# Patient Record
Sex: FEMALE | Race: WHITE
[De-identification: names, ages, dates, MRNs, and addresses within clinical notes are randomized per-mention and may not be internally consistent; named-entity substitution may affect disease eponyms.]

---

## 2018-05-18 ENCOUNTER — HOSPITAL ENCOUNTER (INPATIENT)
Dept: HOSPITAL 54 - ER | Age: 53
LOS: 5 days | Discharge: HOME | DRG: 242 | End: 2018-05-23
Attending: NURSE PRACTITIONER | Admitting: NURSE PRACTITIONER
Payer: MEDICAID

## 2018-05-18 VITALS — SYSTOLIC BLOOD PRESSURE: 160 MMHG | DIASTOLIC BLOOD PRESSURE: 95 MMHG

## 2018-05-18 VITALS — SYSTOLIC BLOOD PRESSURE: 142 MMHG | DIASTOLIC BLOOD PRESSURE: 80 MMHG

## 2018-05-18 VITALS — BODY MASS INDEX: 22.34 KG/M2 | WEIGHT: 144.03 LBS | HEIGHT: 67.5 IN

## 2018-05-18 VITALS — DIASTOLIC BLOOD PRESSURE: 92 MMHG | SYSTOLIC BLOOD PRESSURE: 158 MMHG

## 2018-05-18 VITALS — SYSTOLIC BLOOD PRESSURE: 158 MMHG | DIASTOLIC BLOOD PRESSURE: 92 MMHG

## 2018-05-18 VITALS — SYSTOLIC BLOOD PRESSURE: 165 MMHG | DIASTOLIC BLOOD PRESSURE: 92 MMHG

## 2018-05-18 DIAGNOSIS — K85.90: ICD-10-CM

## 2018-05-18 DIAGNOSIS — D63.1: ICD-10-CM

## 2018-05-18 DIAGNOSIS — D69.6: ICD-10-CM

## 2018-05-18 DIAGNOSIS — I12.0: ICD-10-CM

## 2018-05-18 DIAGNOSIS — E87.2: ICD-10-CM

## 2018-05-18 DIAGNOSIS — N18.6: ICD-10-CM

## 2018-05-18 DIAGNOSIS — N39.0: ICD-10-CM

## 2018-05-18 DIAGNOSIS — E44.1: ICD-10-CM

## 2018-05-18 DIAGNOSIS — D62: ICD-10-CM

## 2018-05-18 DIAGNOSIS — D69.1: ICD-10-CM

## 2018-05-18 DIAGNOSIS — K22.6: Primary | ICD-10-CM

## 2018-05-18 DIAGNOSIS — E83.42: ICD-10-CM

## 2018-05-18 DIAGNOSIS — Q61.3: ICD-10-CM

## 2018-05-18 DIAGNOSIS — Z86.73: ICD-10-CM

## 2018-05-18 DIAGNOSIS — G44.209: ICD-10-CM

## 2018-05-18 DIAGNOSIS — K29.70: ICD-10-CM

## 2018-05-18 LAB
ALBUMIN SERPL BCP-MCNC: 3.1 G/DL (ref 3.4–5)
ALP SERPL-CCNC: 20 U/L (ref 46–116)
ALT SERPL W P-5'-P-CCNC: 12 U/L (ref 12–78)
APTT PPP: 23 SEC (ref 23–34)
AST SERPL W P-5'-P-CCNC: 8 U/L (ref 15–37)
BASE EXCESS BLDA CALC-SCNC: -17.5 MMOL/L
BASOPHILS # BLD AUTO: 0 /CMM (ref 0–0.2)
BASOPHILS NFR BLD AUTO: 0 % (ref 0–2)
BASOPHILS NFR BLD AUTO: 0.2 % (ref 0–2)
BASOPHILS NFR BLD AUTO: 0.3 % (ref 0–2)
BILIRUB DIRECT SERPL-MCNC: 0.1 MG/DL (ref 0–0.2)
BILIRUB SERPL-MCNC: 0.4 MG/DL (ref 0.2–1)
BUN SERPL-MCNC: 238 MG/DL (ref 7–18)
CALCIUM SERPL-MCNC: 7.7 MG/DL (ref 8.5–10.1)
CHLORIDE SERPL-SCNC: 100 MMOL/L (ref 98–107)
CO2 SERPL-SCNC: 13 MMOL/L (ref 21–32)
CREAT SERPL-MCNC: 21.1 MG/DL (ref 0.6–1.3)
EOSINOPHIL NFR BLD AUTO: 0 % (ref 0–6)
EOSINOPHIL NFR BLD AUTO: 0.1 % (ref 0–6)
EOSINOPHIL NFR BLD AUTO: 0.9 % (ref 0–6)
FERRITIN SERPL-MCNC: 26 NG/ML (ref 8–388)
GLUCOSE SERPL-MCNC: 149 MG/DL (ref 74–106)
GLUCOSE UR STRIP-MCNC: (no result) MG/DL
HCT VFR BLD AUTO: 21 % (ref 33–45)
HCT VFR BLD AUTO: 23 % (ref 33–45)
HCT VFR BLD AUTO: 25 % (ref 33–45)
HGB BLD-MCNC: 7 G/DL (ref 11.5–14.8)
HGB BLD-MCNC: 7.8 G/DL (ref 11.5–14.8)
HGB BLD-MCNC: 8.1 G/DL (ref 11.5–14.8)
INHALED O2 CONCENTRATION: 21 %
INR PPP: 0.9 (ref 0.85–1.15)
IRON SERPL-MCNC: 68 UG/DL (ref 50–175)
LIPASE SERPL-CCNC: 622 U/L (ref 73–393)
LYMPHOCYTES NFR BLD AUTO: 0.2 /CMM (ref 0.8–4.8)
LYMPHOCYTES NFR BLD AUTO: 0.4 /CMM (ref 0.8–4.8)
LYMPHOCYTES NFR BLD AUTO: 0.4 /CMM (ref 0.8–4.8)
LYMPHOCYTES NFR BLD AUTO: 2.7 % (ref 20–44)
LYMPHOCYTES NFR BLD AUTO: 3.7 % (ref 20–44)
LYMPHOCYTES NFR BLD AUTO: 4.7 % (ref 20–44)
MCHC RBC AUTO-ENTMCNC: 33 G/DL (ref 31–36)
MCHC RBC AUTO-ENTMCNC: 33 G/DL (ref 31–36)
MCHC RBC AUTO-ENTMCNC: 34 G/DL (ref 31–36)
MCV RBC AUTO: 90 FL (ref 82–100)
MONOCYTES NFR BLD AUTO: 0.1 /CMM (ref 0.1–1.3)
MONOCYTES NFR BLD AUTO: 0.3 /CMM (ref 0.1–1.3)
MONOCYTES NFR BLD AUTO: 0.6 /CMM (ref 0.1–1.3)
MONOCYTES NFR BLD AUTO: 1.7 % (ref 2–12)
MONOCYTES NFR BLD AUTO: 3.2 % (ref 2–12)
MONOCYTES NFR BLD AUTO: 6.4 % (ref 2–12)
NEUTROPHILS # BLD AUTO: 7.5 /CMM (ref 1.8–8.9)
NEUTROPHILS # BLD AUTO: 7.6 /CMM (ref 1.8–8.9)
NEUTROPHILS # BLD AUTO: 8.6 /CMM (ref 1.8–8.9)
NEUTROPHILS NFR BLD AUTO: 89.9 % (ref 43–81)
NEUTROPHILS NFR BLD AUTO: 91.7 % (ref 43–81)
NEUTROPHILS NFR BLD AUTO: 94.5 % (ref 43–81)
PCO2 TEMP ADJ BLDA: 26.5 MMHG (ref 35–45)
PH TEMP ADJ BLDA: 7.17 [PH] (ref 7.35–7.45)
PH UR STRIP: 6 [PH] (ref 5–8)
PLATELET # BLD AUTO: 149 /CMM (ref 150–450)
PLATELET # BLD AUTO: 161 /CMM (ref 150–450)
PLATELET # BLD AUTO: 207 /CMM (ref 150–450)
PO2 TEMP ADJ BLDA: 43 MMHG (ref 75–100)
POTASSIUM SERPL-SCNC: 4.8 MMOL/L (ref 3.5–5.1)
PROT SERPL-MCNC: 6.9 G/DL (ref 6.4–8.2)
PROT UR QL STRIP: >=300 MG/DL
RBC # BLD AUTO: 2.35 MIL/UL (ref 4–5.2)
RBC # BLD AUTO: 2.56 MIL/UL (ref 4–5.2)
RBC # BLD AUTO: 2.78 MIL/UL (ref 4–5.2)
RBC #/AREA URNS HPF: (no result) /HPF (ref 0–2)
RDW COEFFICIENT OF VARIATION: 15.1 (ref 11.5–15)
RDW COEFFICIENT OF VARIATION: 15.4 (ref 11.5–15)
RDW COEFFICIENT OF VARIATION: 15.4 (ref 11.5–15)
SAO2 % BLDA: 68.9 % (ref 92–98.5)
SODIUM SERPL-SCNC: 137 MMOL/L (ref 136–145)
TIBC SERPL-MCNC: 344 UG/DL (ref 250–450)
UROBILINOGEN UR STRIP-MCNC: 0.2 EU/DL
VENTILATION MODE VENT: (no result)
WBC #/AREA URNS HPF: (no result) /HPF (ref 0–3)
WBC NRBC COR # BLD AUTO: 7.9 K/UL (ref 4.3–11)
WBC NRBC COR # BLD AUTO: 8.3 K/UL (ref 4.3–11)
WBC NRBC COR # BLD AUTO: 9.6 K/UL (ref 4.3–11)

## 2018-05-18 PROCEDURE — Z7610: HCPCS

## 2018-05-18 PROCEDURE — 06HY33Z INSERTION OF INFUSION DEVICE INTO LOWER VEIN, PERCUTANEOUS APPROACH: ICD-10-PCS | Performed by: NURSE PRACTITIONER

## 2018-05-18 PROCEDURE — A4216 STERILE WATER/SALINE, 10 ML: HCPCS

## 2018-05-18 PROCEDURE — 05HY33Z INSERTION OF INFUSION DEVICE INTO UPPER VEIN, PERCUTANEOUS APPROACH: ICD-10-PCS

## 2018-05-18 PROCEDURE — A4606 OXYGEN PROBE USED W OXIMETER: HCPCS

## 2018-05-18 PROCEDURE — C9113 INJ PANTOPRAZOLE SODIUM, VIA: HCPCS

## 2018-05-18 PROCEDURE — 5A1D70Z PERFORMANCE OF URINARY FILTRATION, INTERMITTENT, LESS THAN 6 HOURS PER DAY: ICD-10-PCS | Performed by: INTERNAL MEDICINE

## 2018-05-18 PROCEDURE — A6402 STERILE GAUZE <= 16 SQ IN: HCPCS

## 2018-05-18 PROCEDURE — A6403 STERILE GAUZE>16 <= 48 SQ IN: HCPCS

## 2018-05-18 PROCEDURE — C1750 CATH, HEMODIALYSIS,LONG-TERM: HCPCS

## 2018-05-18 RX ADMIN — DEXTROSE MONOHYDRATE PRN MG: 50 INJECTION, SOLUTION INTRAVENOUS at 20:19

## 2018-05-18 RX ADMIN — Medication SCH MG: at 17:08

## 2018-05-18 RX ADMIN — SODIUM CHLORIDE SCH MG: 9 INJECTION, SOLUTION INTRAVENOUS at 17:08

## 2018-05-18 NOTE — NUR
TD/RN     DIALYSIS CATHETER & MIDLINE PLACEMENT



DR. YANES AT BEDSIDE EXPLAINING THE PROCEDURE OF DIALYSIS CATHETER & MIDLINE PLACEMENT TO 
PATIENT, VERBALIZED UNDERSTANDING.  



CONSENTS FOR DIALYSIS CATHETER & MIDLINE PLACEMENT, ANESTHESIA FOR THE SAID PROCEDURES, EGD 
AND BLOOD TRANSFUSION OBTAINED FROM PATIENT AND FILED INTO PT'S CHART.  



DIALYSIS CATHETER & MIDLINE PLACEMENT BEING PERFORMED BY BEDSIDE BY DR. YANES.  MONITORING.

## 2018-05-18 NOTE — NUR
TD/RN     DIALYSIS TX



DIALYSIS TX BEGINS, PT WILL RECEIVED 1 UNIT OF PRBC WITH THE TX.  MONITORING.

## 2018-05-18 NOTE — NUR
TD/RN     REPORT FROM ER



REPORT RECEIVED FROM ER NURSE LILIAN FOR PT TO BE ADMITTED FOR GI BLEED UNDER THE CARE OF 
SEAN TERAN NP.  AWAITING FOR PT'S ARRIVAL.

## 2018-05-18 NOTE — NUR
TD/RN     EPOGEN - REFUSED



PT REFUSED EPOGEN SHOT, PER PT SHE ALREADY RECEIVED EPOGEN LAST THURSDAY, NP NOTIFIED.

## 2018-05-18 NOTE — NUR
TD/RN     AM SHIFT END NOTES



DIALYSIS ON GOING, NO ACUTE BLEEDING NOTED SINCE PT WAS ADMITTED TO THE UNIT.  PT ENDORSED 
TO PM NURSE TO CONTINUE CARE. CL WITHIN REACHED AND SAFETY MAINTAINED.

## 2018-05-18 NOTE — NUR
TD/RN     1 PRBC WITH DIALYSIS



NOTIFIED JAMMIE TERAN. OF CRITICAL LAB VALUE HGB 7.0 & HCT 21, WITH ORDER RECEIVED VERBAL 
ORDER TO TRANSFUSE 1 UNIT OF PRBC WITH DIALYSIS.  ORDER NOTED AND TO BE CARRIED.

## 2018-05-18 NOTE — NUR
TD/RN     CTA OF BRAIN



CTA OF BRAIN TO R/O ANEURYSM PERFORMED, PT TOLERATED PROCEDURE, RETURNED TO ROOM.  
MONITORING CONTINUED.

## 2018-05-18 NOTE — NUR
RN JOSUÉ NOTE 



RECEIVED PATIENT RESTING COMFORTABLY IN BED, SPEECH CLEAR, AOX3, HD IN PROGRESS AND 1 UNIT 
PRBC RUNNING IN RA MIDLINE, DENIES PAIN, HOB ELEVATED, DENIES ANY S/SX OF RESPIRATORY OR 
CARDIAC DISTRESS, SKIN KEPT CLEAN AND DRY, AMBULATORY, DENIES PAIN, SAFETY MAINTAINED AT ALL 
TIMES, CALL LIGHT WITHIN REACH, WILL CONTINUE TO MONITOR FOR ANY CHANGES IN CONDITION.

## 2018-05-18 NOTE — NUR
AAOX3, C/O vomiting blood clots x 24 hrs. headache x 10 days odor during 
urination x 4 days. denies hematuria, or discharge. RR is even and unlabored 
with nad noted. skin is warm and dry. Awaiting md for bret.

## 2018-05-18 NOTE — NUR
TD/RN     ADMIT TO JOSUÉ - ROOM 117#1



PT ARRIVED VIA GURNEY FROM ER ACCOMPANIED BY ER NURSE LILIAN.  PT A/O X 4, DENIES ANY 
SYMPTOMS, NO ACUTE BLEEDING NOTED.  ON ROOM AIR SATURATING @ 100%, LUNG SOUNDS CLEAR.  TELE 
MONITORING PLACED, SINUS RHYTHM, HR 96.  IV SITE FLUSHED, OCCLUDED, WILL PLACED ANOTHER IV 
ACCESS.  ON GOING ADMITTING PROTOCOLS BEING PROCESSED.  CL WITHIN REACHED AND SAFETY 
MAINTAINED.  ON GOING MONITORING.

## 2018-05-19 VITALS — DIASTOLIC BLOOD PRESSURE: 87 MMHG | SYSTOLIC BLOOD PRESSURE: 153 MMHG

## 2018-05-19 VITALS — DIASTOLIC BLOOD PRESSURE: 88 MMHG | SYSTOLIC BLOOD PRESSURE: 133 MMHG

## 2018-05-19 VITALS — DIASTOLIC BLOOD PRESSURE: 90 MMHG | SYSTOLIC BLOOD PRESSURE: 146 MMHG

## 2018-05-19 VITALS — SYSTOLIC BLOOD PRESSURE: 133 MMHG | DIASTOLIC BLOOD PRESSURE: 88 MMHG

## 2018-05-19 VITALS — SYSTOLIC BLOOD PRESSURE: 122 MMHG | DIASTOLIC BLOOD PRESSURE: 87 MMHG

## 2018-05-19 VITALS — DIASTOLIC BLOOD PRESSURE: 97 MMHG | SYSTOLIC BLOOD PRESSURE: 167 MMHG

## 2018-05-19 VITALS — DIASTOLIC BLOOD PRESSURE: 91 MMHG | SYSTOLIC BLOOD PRESSURE: 148 MMHG

## 2018-05-19 LAB
BASOPHILS # BLD AUTO: 0 /CMM (ref 0–0.2)
BASOPHILS NFR BLD AUTO: 0.1 % (ref 0–2)
BASOPHILS NFR BLD AUTO: 0.1 % (ref 0–2)
BASOPHILS NFR BLD AUTO: 0.3 % (ref 0–2)
BUN SERPL-MCNC: 150 MG/DL (ref 7–18)
CALCIUM SERPL-MCNC: 7.7 MG/DL (ref 8.5–10.1)
CHLORIDE SERPL-SCNC: 103 MMOL/L (ref 98–107)
CHOLEST SERPL-MCNC: 139 MG/DL (ref ?–200)
CO2 SERPL-SCNC: 16 MMOL/L (ref 21–32)
CREAT SERPL-MCNC: 15.2 MG/DL (ref 0.6–1.3)
EOSINOPHIL NFR BLD AUTO: 0.1 % (ref 0–6)
EOSINOPHIL NFR BLD AUTO: 0.1 % (ref 0–6)
EOSINOPHIL NFR BLD AUTO: 0.2 % (ref 0–6)
GLUCOSE SERPL-MCNC: 92 MG/DL (ref 74–106)
HCT VFR BLD AUTO: 21 % (ref 33–45)
HCT VFR BLD AUTO: 21 % (ref 33–45)
HCT VFR BLD AUTO: 23 % (ref 33–45)
HDLC SERPL-MCNC: 43 MG/DL (ref 40–60)
HGB BLD-MCNC: 7.1 G/DL (ref 11.5–14.8)
HGB BLD-MCNC: 7.2 G/DL (ref 11.5–14.8)
HGB BLD-MCNC: 7.5 G/DL (ref 11.5–14.8)
LDLC SERPL DIRECT ASSAY-MCNC: 88 MG/DL (ref 0–99)
LYMPHOCYTES NFR BLD AUTO: 0.6 /CMM (ref 0.8–4.8)
LYMPHOCYTES NFR BLD AUTO: 0.6 /CMM (ref 0.8–4.8)
LYMPHOCYTES NFR BLD AUTO: 0.7 /CMM (ref 0.8–4.8)
LYMPHOCYTES NFR BLD AUTO: 8.8 % (ref 20–44)
LYMPHOCYTES NFR BLD AUTO: 9.2 % (ref 20–44)
LYMPHOCYTES NFR BLD AUTO: 9.4 % (ref 20–44)
MAGNESIUM SERPL-MCNC: 1.5 MG/DL (ref 1.8–2.4)
MCHC RBC AUTO-ENTMCNC: 33 G/DL (ref 31–36)
MCHC RBC AUTO-ENTMCNC: 33 G/DL (ref 31–36)
MCHC RBC AUTO-ENTMCNC: 34 G/DL (ref 31–36)
MCV RBC AUTO: 90 FL (ref 82–100)
MONOCYTES NFR BLD AUTO: 0.5 /CMM (ref 0.1–1.3)
MONOCYTES NFR BLD AUTO: 0.5 /CMM (ref 0.1–1.3)
MONOCYTES NFR BLD AUTO: 0.6 /CMM (ref 0.1–1.3)
MONOCYTES NFR BLD AUTO: 7.1 % (ref 2–12)
MONOCYTES NFR BLD AUTO: 7.3 % (ref 2–12)
MONOCYTES NFR BLD AUTO: 7.7 % (ref 2–12)
NEUTROPHILS # BLD AUTO: 4.9 /CMM (ref 1.8–8.9)
NEUTROPHILS # BLD AUTO: 5.6 /CMM (ref 1.8–8.9)
NEUTROPHILS # BLD AUTO: 6.5 /CMM (ref 1.8–8.9)
NEUTROPHILS NFR BLD AUTO: 82.7 % (ref 43–81)
NEUTROPHILS NFR BLD AUTO: 83.2 % (ref 43–81)
NEUTROPHILS NFR BLD AUTO: 83.7 % (ref 43–81)
PHOSPHATE SERPL-MCNC: 7.2 MG/DL (ref 2.5–4.9)
PLATELET # BLD AUTO: 127 /CMM (ref 150–450)
PLATELET # BLD AUTO: 138 /CMM (ref 150–450)
PLATELET # BLD AUTO: 142 /CMM (ref 150–450)
POTASSIUM SERPL-SCNC: 4.4 MMOL/L (ref 3.5–5.1)
RBC # BLD AUTO: 2.34 MIL/UL (ref 4–5.2)
RBC # BLD AUTO: 2.36 MIL/UL (ref 4–5.2)
RBC # BLD AUTO: 2.49 MIL/UL (ref 4–5.2)
RDW COEFFICIENT OF VARIATION: 15.2 (ref 11.5–15)
RDW COEFFICIENT OF VARIATION: 15.5 (ref 11.5–15)
RDW COEFFICIENT OF VARIATION: 15.7 (ref 11.5–15)
SODIUM SERPL-SCNC: 140 MMOL/L (ref 136–145)
TRIGL SERPL-MCNC: 92 MG/DL (ref 30–150)
TSH SERPL DL<=0.005 MIU/L-ACNC: 3.67 UIU/ML (ref 0.36–3.74)
WBC NRBC COR # BLD AUTO: 5.9 K/UL (ref 4.3–11)
WBC NRBC COR # BLD AUTO: 6.7 K/UL (ref 4.3–11)
WBC NRBC COR # BLD AUTO: 7.8 K/UL (ref 4.3–11)

## 2018-05-19 PROCEDURE — 0W3P8ZZ CONTROL BLEEDING IN GASTROINTESTINAL TRACT, VIA NATURAL OR ARTIFICIAL OPENING ENDOSCOPIC: ICD-10-PCS

## 2018-05-19 PROCEDURE — 5A1D70Z PERFORMANCE OF URINARY FILTRATION, INTERMITTENT, LESS THAN 6 HOURS PER DAY: ICD-10-PCS

## 2018-05-19 PROCEDURE — 0DB68ZX EXCISION OF STOMACH, VIA NATURAL OR ARTIFICIAL OPENING ENDOSCOPIC, DIAGNOSTIC: ICD-10-PCS

## 2018-05-19 RX ADMIN — SODIUM CHLORIDE SCH MG: 9 INJECTION, SOLUTION INTRAVENOUS at 08:00

## 2018-05-19 RX ADMIN — DEXTROSE MONOHYDRATE PRN MG: 50 INJECTION, SOLUTION INTRAVENOUS at 05:06

## 2018-05-19 RX ADMIN — DEXTROSE MONOHYDRATE PRN MG: 50 INJECTION, SOLUTION INTRAVENOUS at 16:25

## 2018-05-19 RX ADMIN — MAGNESIUM SULFATE IN DEXTROSE SCH MLS/HR: 10 INJECTION, SOLUTION INTRAVENOUS at 13:16

## 2018-05-19 RX ADMIN — DEXTROSE MONOHYDRATE PRN MG: 50 INJECTION, SOLUTION INTRAVENOUS at 21:46

## 2018-05-19 RX ADMIN — AMLODIPINE BESYLATE SCH MG: 5 TABLET ORAL at 08:38

## 2018-05-19 RX ADMIN — FERROUS SULFATE TAB 325 MG (65 MG ELEMENTAL FE) SCH MG: 325 (65 FE) TAB at 13:16

## 2018-05-19 RX ADMIN — Medication SCH G: at 17:42

## 2018-05-19 RX ADMIN — PANTOPRAZOLE SODIUM SCH MG: 40 TABLET, DELAYED RELEASE ORAL at 17:42

## 2018-05-19 RX ADMIN — Medication PRN EACH: at 21:45

## 2018-05-19 RX ADMIN — Medication SCH MG: at 17:42

## 2018-05-19 RX ADMIN — Medication SCH MG: at 09:00

## 2018-05-19 RX ADMIN — DEXTROSE MONOHYDRATE SCH MLS/HR: 50 INJECTION, SOLUTION INTRAVENOUS at 09:05

## 2018-05-19 RX ADMIN — ACETAMINOPHEN PRN MG: 325 TABLET ORAL at 16:48

## 2018-05-19 RX ADMIN — Medication SCH MG: at 13:16

## 2018-05-19 RX ADMIN — CALCITRIOL SCH MCG: 0.25 CAPSULE, GELATIN COATED ORAL at 13:16

## 2018-05-19 RX ADMIN — MAGNESIUM SULFATE IN DEXTROSE SCH MLS/HR: 10 INJECTION, SOLUTION INTRAVENOUS at 14:10

## 2018-05-19 NOTE — NUR
RN NOTES 

RECEIVED PT FROM OR , S/P EGD, PT A/OX4, NO DISTRESS NOTED, VSS STABLE , CONTINUE  TO 
MONITOR .

## 2018-05-19 NOTE — NUR
RN NOTE 

RECEIVED PATIENT ON BED, AOX3, ON RA , RESPIRATION EVEN AND UNLABORED, NO DISTRESS NOTED, ON 
TELE SR HR IN 80'S , R UPPER ARM IV SITE G 18 AND R FEMORAL HD CATH SITES CDI, SR UPx3,  
SAFETY MAINTAINED AT ALL TIMES, CALL LIGHT WITHIN REACH, WILL CONTINUE TO MONITOR

## 2018-05-19 NOTE — NUR
NP LOUIS MADDOX WAS  NOTIFIED ABOUT PT'S PAIN LEVEL AND ALLERGY TO CODEINE. AS A  PAIN 
MANAGEMENT NORCO 5-325MG PO Q4HR PRN HAS BEEN ORDERED BY NP.

## 2018-05-19 NOTE — NUR
RN NOTES 

PT VOMITED SMALL AMOUNT OF YELLOWISH EMESIS , SEAN TERAN NP NOTIFIED , CONTINUE TO 
MONITOR .

## 2018-05-19 NOTE — NUR
RN NOTES 

DIALYSIS DONE ON THIS SHIFT , ZERO FLUID OUT PER HD NURSE , NO ACTIVE BLEEDING NOTED ON THIS 
SHIFT ,  PT STABLE , SR ON TELE , C/O NAUSEA AT TIMES, MEDICATED PER MD ORDER ,  R FEMORAL 
HD CATH SITE CDI, SR UP x3, CALL LIGHT WITHIN EASY REACH, WILL ENDORSE TO NIGHT SHIFT NURSE 
FOR MERY .

## 2018-05-19 NOTE — NUR
RN JOSUÉ NOTE 



RECEIVED BEDSIDE REPORT FROM AM NURSE. PATIENT IS RESTING COMFORTABLY IN BED, SPEECH CLEAR, 
AOX4, RIGHT FEMORAL HD CATH. NOTED WITH DRY DRESSING. FOLRY MIDLINE IV LINE IS INTACT, 
PATIENT. PT COMPLAINS OF  PAIN7/10 AND NAUSEA, HOB ELEVATED, DENIES ANY S/SX OF RESPIRATORY 
OR CARDIAC DISTRESS, SKIN IS CLEAN AND DRY, AMBULATORY,BUT COMPLAINS OF A LITTLE BIT OF 
DIZZINESS WITH AMBULATION. SAFETY MEASURES ARE IMPLEMENTED, CALL LIGHT WITHIN REACH, WILL 
CONTINUE TO MONITOR FOR ANY CHANGES IN CONDITION.

## 2018-05-20 VITALS — SYSTOLIC BLOOD PRESSURE: 147 MMHG | DIASTOLIC BLOOD PRESSURE: 80 MMHG

## 2018-05-20 VITALS — SYSTOLIC BLOOD PRESSURE: 138 MMHG | DIASTOLIC BLOOD PRESSURE: 74 MMHG

## 2018-05-20 VITALS — SYSTOLIC BLOOD PRESSURE: 169 MMHG | DIASTOLIC BLOOD PRESSURE: 83 MMHG

## 2018-05-20 VITALS — SYSTOLIC BLOOD PRESSURE: 125 MMHG | DIASTOLIC BLOOD PRESSURE: 89 MMHG

## 2018-05-20 VITALS — DIASTOLIC BLOOD PRESSURE: 97 MMHG | SYSTOLIC BLOOD PRESSURE: 167 MMHG

## 2018-05-20 VITALS — SYSTOLIC BLOOD PRESSURE: 158 MMHG | DIASTOLIC BLOOD PRESSURE: 86 MMHG

## 2018-05-20 LAB
BASOPHILS # BLD AUTO: 0 /CMM (ref 0–0.2)
BASOPHILS NFR BLD AUTO: 0.2 % (ref 0–2)
BASOPHILS NFR BLD AUTO: 0.3 % (ref 0–2)
BUN SERPL-MCNC: 81 MG/DL (ref 7–18)
CALCIUM SERPL-MCNC: 7.7 MG/DL (ref 8.5–10.1)
CHLORIDE SERPL-SCNC: 101 MMOL/L (ref 98–107)
CO2 SERPL-SCNC: 26 MMOL/L (ref 21–32)
CREAT SERPL-MCNC: 10.1 MG/DL (ref 0.6–1.3)
EOSINOPHIL NFR BLD AUTO: 0 % (ref 0–6)
EOSINOPHIL NFR BLD AUTO: 0.1 % (ref 0–6)
EOSINOPHIL NFR BLD AUTO: 0.1 % (ref 0–6)
EOSINOPHIL NFR BLD AUTO: 0.8 % (ref 0–6)
GLUCOSE SERPL-MCNC: 99 MG/DL (ref 74–106)
HCT VFR BLD AUTO: 19 % (ref 33–45)
HCT VFR BLD AUTO: 22 % (ref 33–45)
HCT VFR BLD AUTO: 24 % (ref 33–45)
HCT VFR BLD AUTO: 26 % (ref 33–45)
HGB BLD-MCNC: 6.5 G/DL (ref 11.5–14.8)
HGB BLD-MCNC: 7.4 G/DL (ref 11.5–14.8)
HGB BLD-MCNC: 7.9 G/DL (ref 11.5–14.8)
HGB BLD-MCNC: 8.8 G/DL (ref 11.5–14.8)
LYMPHOCYTES NFR BLD AUTO: 0.4 /CMM (ref 0.8–4.8)
LYMPHOCYTES NFR BLD AUTO: 0.5 /CMM (ref 0.8–4.8)
LYMPHOCYTES NFR BLD AUTO: 0.7 /CMM (ref 0.8–4.8)
LYMPHOCYTES NFR BLD AUTO: 0.9 /CMM (ref 0.8–4.8)
LYMPHOCYTES NFR BLD AUTO: 10.3 % (ref 20–44)
LYMPHOCYTES NFR BLD AUTO: 10.5 % (ref 20–44)
LYMPHOCYTES NFR BLD AUTO: 4.5 % (ref 20–44)
LYMPHOCYTES NFR BLD AUTO: 8.3 % (ref 20–44)
LYMPHOCYTES NFR BLD MANUAL: 10 % (ref 16–48)
LYMPHOCYTES NFR BLD MANUAL: 5 % (ref 16–48)
MAGNESIUM SERPL-MCNC: 2.4 MG/DL (ref 1.8–2.4)
MCHC RBC AUTO-ENTMCNC: 33 G/DL (ref 31–36)
MCHC RBC AUTO-ENTMCNC: 33 G/DL (ref 31–36)
MCHC RBC AUTO-ENTMCNC: 34 G/DL (ref 31–36)
MCHC RBC AUTO-ENTMCNC: 34 G/DL (ref 31–36)
MCV RBC AUTO: 88 FL (ref 82–100)
MCV RBC AUTO: 89 FL (ref 82–100)
MCV RBC AUTO: 91 FL (ref 82–100)
MCV RBC AUTO: 91 FL (ref 82–100)
MONOCYTES NFR BLD AUTO: 0.4 /CMM (ref 0.1–1.3)
MONOCYTES NFR BLD AUTO: 0.6 /CMM (ref 0.1–1.3)
MONOCYTES NFR BLD AUTO: 0.8 /CMM (ref 0.1–1.3)
MONOCYTES NFR BLD AUTO: 0.8 /CMM (ref 0.1–1.3)
MONOCYTES NFR BLD AUTO: 7.7 % (ref 2–12)
MONOCYTES NFR BLD AUTO: 8 % (ref 2–12)
MONOCYTES NFR BLD AUTO: 8.3 % (ref 2–12)
MONOCYTES NFR BLD AUTO: 9 % (ref 2–12)
MONOCYTES NFR BLD MANUAL: 7 % (ref 0–11)
MONOCYTES NFR BLD MANUAL: 7 % (ref 0–11)
NEUTROPHILS # BLD AUTO: 4.6 /CMM (ref 1.8–8.9)
NEUTROPHILS # BLD AUTO: 5.8 /CMM (ref 1.8–8.9)
NEUTROPHILS # BLD AUTO: 6.8 /CMM (ref 1.8–8.9)
NEUTROPHILS # BLD AUTO: 8.2 /CMM (ref 1.8–8.9)
NEUTROPHILS NFR BLD AUTO: 80.1 % (ref 43–81)
NEUTROPHILS NFR BLD AUTO: 81.1 % (ref 43–81)
NEUTROPHILS NFR BLD AUTO: 83.8 % (ref 43–81)
NEUTROPHILS NFR BLD AUTO: 86.5 % (ref 43–81)
NEUTS BAND NFR BLD MANUAL: 1 % (ref 0–5)
NEUTS SEG NFR BLD MANUAL: 83 % (ref 42–76)
NEUTS SEG NFR BLD MANUAL: 87 % (ref 42–76)
PHOSPHATE SERPL-MCNC: 7.3 MG/DL (ref 2.5–4.9)
PLATELET # BLD AUTO: 114 /CMM (ref 150–450)
PLATELET # BLD AUTO: 117 /CMM (ref 150–450)
PLATELET # BLD AUTO: 137 /CMM (ref 150–450)
PLATELET # BLD AUTO: 143 /CMM (ref 150–450)
POTASSIUM SERPL-SCNC: 4.6 MMOL/L (ref 3.5–5.1)
RBC # BLD AUTO: 2.13 MIL/UL (ref 4–5.2)
RBC # BLD AUTO: 2.46 MIL/UL (ref 4–5.2)
RBC # BLD AUTO: 2.67 MIL/UL (ref 4–5.2)
RBC # BLD AUTO: 2.95 MIL/UL (ref 4–5.2)
RDW COEFFICIENT OF VARIATION: 15.6 (ref 11.5–15)
RDW COEFFICIENT OF VARIATION: 15.9 (ref 11.5–15)
RDW COEFFICIENT OF VARIATION: 17 (ref 11.5–15)
RDW COEFFICIENT OF VARIATION: 17.5 (ref 11.5–15)
SODIUM SERPL-SCNC: 138 MMOL/L (ref 136–145)
WBC NRBC COR # BLD AUTO: 5.5 K/UL (ref 4.3–11)
WBC NRBC COR # BLD AUTO: 7.2 K/UL (ref 4.3–11)
WBC NRBC COR # BLD AUTO: 8.5 K/UL (ref 4.3–11)
WBC NRBC COR # BLD AUTO: 9.5 K/UL (ref 4.3–11)

## 2018-05-20 PROCEDURE — 5A1D70Z PERFORMANCE OF URINARY FILTRATION, INTERMITTENT, LESS THAN 6 HOURS PER DAY: ICD-10-PCS | Performed by: INTERNAL MEDICINE

## 2018-05-20 RX ADMIN — Medication SCH G: at 13:00

## 2018-05-20 RX ADMIN — FERROUS SULFATE TAB 325 MG (65 MG ELEMENTAL FE) SCH MG: 325 (65 FE) TAB at 10:21

## 2018-05-20 RX ADMIN — Medication PRN EACH: at 10:19

## 2018-05-20 RX ADMIN — Medication SCH MG: at 09:00

## 2018-05-20 RX ADMIN — Medication SCH G: at 17:00

## 2018-05-20 RX ADMIN — Medication SCH G: at 10:20

## 2018-05-20 RX ADMIN — SODIUM CHLORIDE SCH MG: 9 INJECTION, SOLUTION INTRAVENOUS at 18:12

## 2018-05-20 RX ADMIN — Medication SCH G: at 09:00

## 2018-05-20 RX ADMIN — DEXTROSE MONOHYDRATE PRN MG: 50 INJECTION, SOLUTION INTRAVENOUS at 20:42

## 2018-05-20 RX ADMIN — CALCITRIOL SCH MCG: 0.25 CAPSULE, GELATIN COATED ORAL at 10:21

## 2018-05-20 RX ADMIN — PROCHLORPERAZINE EDISYLATE PRN MG: 5 INJECTION INTRAMUSCULAR; INTRAVENOUS at 13:01

## 2018-05-20 RX ADMIN — ACETAMINOPHEN PRN MG: 325 TABLET ORAL at 20:42

## 2018-05-20 RX ADMIN — Medication SCH MG: at 10:21

## 2018-05-20 RX ADMIN — PANTOPRAZOLE SODIUM SCH MG: 40 TABLET, DELAYED RELEASE ORAL at 10:21

## 2018-05-20 RX ADMIN — AMLODIPINE BESYLATE SCH MG: 5 TABLET ORAL at 10:20

## 2018-05-20 RX ADMIN — FERROUS SULFATE TAB 325 MG (65 MG ELEMENTAL FE) SCH MG: 325 (65 FE) TAB at 09:00

## 2018-05-20 RX ADMIN — DEXTROSE MONOHYDRATE PRN MG: 50 INJECTION, SOLUTION INTRAVENOUS at 13:01

## 2018-05-20 RX ADMIN — PROCHLORPERAZINE EDISYLATE PRN MG: 5 INJECTION INTRAMUSCULAR; INTRAVENOUS at 01:47

## 2018-05-20 RX ADMIN — DEXTROSE MONOHYDRATE SCH MLS/HR: 50 INJECTION, SOLUTION INTRAVENOUS at 10:20

## 2018-05-20 RX ADMIN — Medication SCH MG: at 13:00

## 2018-05-20 RX ADMIN — PROCHLORPERAZINE EDISYLATE PRN MG: 5 INJECTION INTRAMUSCULAR; INTRAVENOUS at 08:47

## 2018-05-20 RX ADMIN — Medication SCH MG: at 17:00

## 2018-05-20 RX ADMIN — DEXTROSE MONOHYDRATE PRN MG: 50 INJECTION, SOLUTION INTRAVENOUS at 08:05

## 2018-05-20 NOTE — NUR
RN NOTE

LAB CALLED WITH CRITICAL HGB VALUE OF 6.5.  MD MADE AWARE. ONE UNIT OF PRBC'S  ORDERED.  
WILL TRANSFUSE WITH HD.

## 2018-05-20 NOTE — NUR
RN TD NOTES,



RECEIVED PATIENT IN BED, AWAKE ALERT AND ORIENTED, ABLE TO COMMUNICATE NEEDS, BREATHING EVEN 
AND UNLABORED ON RA O2 SATURATION > 96%, NO C/O PAIN, DENIES NAUSEA AT THIS TIME, NO 
EPISODES  OF VOMITING AT THIS TIME, FLORY MIDLINE PATENT AND INTACT, RIGHT FEMORAL CATH FOR 
HAD INTACT, NO BLEEDING NOTED AT THIS TIME, DRESSING DRY ND CLEAN, ALL NEEDS PROVIDED, BED 
LOCKED AND IN LOWEST POSITION, CALL LIGHT W/I REACH, WILL CONTINUE TO MONITOR CLOSELY.

## 2018-05-20 NOTE — NUR
JOSUÉ RN NOTES



PATIENT IS STILL NAUSEATED AND VOMITED 370ML NON BLOODY CLEAR  FLUIDS. NP ALYSON MYERS 
NOTIFIED. WAITING FOR NEW ORDERS

## 2018-05-21 VITALS — DIASTOLIC BLOOD PRESSURE: 89 MMHG | SYSTOLIC BLOOD PRESSURE: 160 MMHG

## 2018-05-21 VITALS — DIASTOLIC BLOOD PRESSURE: 82 MMHG | SYSTOLIC BLOOD PRESSURE: 156 MMHG

## 2018-05-21 VITALS — SYSTOLIC BLOOD PRESSURE: 173 MMHG | DIASTOLIC BLOOD PRESSURE: 68 MMHG

## 2018-05-21 VITALS — DIASTOLIC BLOOD PRESSURE: 87 MMHG | SYSTOLIC BLOOD PRESSURE: 159 MMHG

## 2018-05-21 VITALS — SYSTOLIC BLOOD PRESSURE: 149 MMHG | DIASTOLIC BLOOD PRESSURE: 84 MMHG

## 2018-05-21 VITALS — SYSTOLIC BLOOD PRESSURE: 159 MMHG | DIASTOLIC BLOOD PRESSURE: 87 MMHG

## 2018-05-21 VITALS — SYSTOLIC BLOOD PRESSURE: 156 MMHG | DIASTOLIC BLOOD PRESSURE: 82 MMHG

## 2018-05-21 VITALS — SYSTOLIC BLOOD PRESSURE: 155 MMHG | DIASTOLIC BLOOD PRESSURE: 89 MMHG

## 2018-05-21 LAB
BASOPHILS # BLD AUTO: 0 /CMM (ref 0–0.2)
BASOPHILS NFR BLD AUTO: 0.3 % (ref 0–2)
BUN SERPL-MCNC: 50 MG/DL (ref 7–18)
CALCIUM SERPL-MCNC: 7.7 MG/DL (ref 8.5–10.1)
CHLORIDE SERPL-SCNC: 100 MMOL/L (ref 98–107)
CO2 SERPL-SCNC: 28 MMOL/L (ref 21–32)
CREAT SERPL-MCNC: 8.1 MG/DL (ref 0.6–1.3)
EOSINOPHIL NFR BLD AUTO: 0.3 % (ref 0–6)
GLUCOSE SERPL-MCNC: 107 MG/DL (ref 74–106)
HCT VFR BLD AUTO: 22 % (ref 33–45)
HCT VFR BLD AUTO: 27 % (ref 33–45)
HGB BLD-MCNC: 7.5 G/DL (ref 11.5–14.8)
HGB BLD-MCNC: 8.7 G/DL (ref 11.5–14.8)
LYMPHOCYTES NFR BLD AUTO: 0.6 /CMM (ref 0.8–4.8)
LYMPHOCYTES NFR BLD AUTO: 8.1 % (ref 20–44)
MCHC RBC AUTO-ENTMCNC: 34 G/DL (ref 31–36)
MCV RBC AUTO: 89 FL (ref 82–100)
MONOCYTES NFR BLD AUTO: 0.6 /CMM (ref 0.1–1.3)
MONOCYTES NFR BLD AUTO: 9 % (ref 2–12)
NEUTROPHILS # BLD AUTO: 5.7 /CMM (ref 1.8–8.9)
NEUTROPHILS NFR BLD AUTO: 82.3 % (ref 43–81)
PLATELET # BLD AUTO: 115 /CMM (ref 150–450)
POTASSIUM SERPL-SCNC: 3.8 MMOL/L (ref 3.5–5.1)
RBC # BLD AUTO: 2.52 MIL/UL (ref 4–5.2)
RDW COEFFICIENT OF VARIATION: 17.1 (ref 11.5–15)
SODIUM SERPL-SCNC: 137 MMOL/L (ref 136–145)
WBC NRBC COR # BLD AUTO: 6.9 K/UL (ref 4.3–11)

## 2018-05-21 RX ADMIN — ACETAMINOPHEN PRN MG: 325 TABLET ORAL at 21:57

## 2018-05-21 RX ADMIN — CALCITRIOL SCH MCG: 0.25 CAPSULE, GELATIN COATED ORAL at 09:36

## 2018-05-21 RX ADMIN — Medication SCH G: at 13:48

## 2018-05-21 RX ADMIN — SODIUM CHLORIDE SCH MG: 9 INJECTION, SOLUTION INTRAVENOUS at 08:25

## 2018-05-21 RX ADMIN — FERROUS SULFATE TAB 325 MG (65 MG ELEMENTAL FE) SCH MG: 325 (65 FE) TAB at 09:00

## 2018-05-21 RX ADMIN — Medication SCH MG: at 13:48

## 2018-05-21 RX ADMIN — AMLODIPINE BESYLATE SCH MG: 5 TABLET ORAL at 10:15

## 2018-05-21 RX ADMIN — PROCHLORPERAZINE EDISYLATE PRN MG: 5 INJECTION INTRAMUSCULAR; INTRAVENOUS at 08:23

## 2018-05-21 RX ADMIN — Medication SCH MG: at 09:36

## 2018-05-21 RX ADMIN — Medication SCH G: at 16:40

## 2018-05-21 RX ADMIN — SODIUM CHLORIDE SCH MG: 9 INJECTION, SOLUTION INTRAVENOUS at 16:40

## 2018-05-21 RX ADMIN — Medication SCH G: at 09:36

## 2018-05-21 RX ADMIN — DEXTROSE MONOHYDRATE SCH MLS/HR: 50 INJECTION, SOLUTION INTRAVENOUS at 10:17

## 2018-05-21 RX ADMIN — ACETAMINOPHEN PRN MG: 325 TABLET ORAL at 03:09

## 2018-05-21 RX ADMIN — Medication SCH MG: at 16:40

## 2018-05-21 RX ADMIN — DEXTROSE MONOHYDRATE PRN MG: 50 INJECTION, SOLUTION INTRAVENOUS at 03:09

## 2018-05-21 NOTE — NUR
RN MS  NOTES,



RECEIVED PATIENT IN BED, AWAKE ALERT AND ORIENTED, ABLE TO COMMUNICATE NEEDS, BREATHING EVEN 
AND UNLABORED ON RA O2 SATURATION > 96%, NO C/O PAIN, DENIES NAUSEA AT THIS TIME, NO 
EPISODES  OF VOMITING AT THIS TIME, FLORY MIDLINE PATENT AND INTACT, RIGHT FEMORAL CATH FOR 
HAD INTACT, NO BLEEDING NOTED AT THIS TIME, DRESSING DRY ND CLEAN, INFORM PATIENT THAT SHE 
WILL BE NPO AFTER MIDNIGHT FOR THE PLACEMENT OF PERMANENT CATH FOR HD IN THE MORNING, AND 
SHE VERBALIZED UNDERSTANDING, ASKING FOR FOOD AT THIS TIME, FOOD PROVIDED AS ORDERED DIET.  
ALL NEEDS PROVIDED, BED LOCKED AND IN LOWEST POSITION, CALL LIGHT W/I REACH, WILL CONTINUE 
TO MONITOR CLOSELY

## 2018-05-21 NOTE — NUR
JOSUÉ RN NOTE:

 

RECEIVED AN ORDER FROM JAMMIE TIRADO RE: THE PATIENT'S CURRENT CLEAR LIQUID DIET. PATIENT MADE 
AWARE OF THE UPGRADE TO FULL LIQUID DIET STARTING LUNCH TIME.

## 2018-05-21 NOTE — NUR
RN MS NOTES,



RE-CHECKED BP AT THIS TIME, AND /99, HR 89, PAGED MD ON CALL TAN LYNCH AWAITING FOR 
NEW ORDERS.

## 2018-05-21 NOTE — NUR
RN TD NOTES,



PATIENT IN BED, AWAKE ALERT AND ORIENTED, ABLE TO COMMUNICATE NEEDS, BREATHING EVEN AND 
UNLABORED ON RA O2 SATURATION > 96%, NO C/O PAIN, 2 EPISODES OF EMESIS AND NAUSEA  DURING 
THE NIGHT , FLORY MIDLINE PATENT AND INTACT, RIGHT FEMORAL CATH FOR HD INTACT, NO BLEEDING 
NOTED AT THIS TIME, DRESSING DRY ND CLEAN, ALL NEEDS PROVIDED, BED LOCKED AND IN LOWEST 
POSITION, CALL LIGHT W/I REACH, WILL ENDORSE CONTINUITY OF CARE TO ONCOMING NURSE.

## 2018-05-21 NOTE — NUR
MS RN NOTE:



PATIENT IS IN BED, SEATED UPRIGHT AWAKE, ALERT AND ABLE TO MAKE HIS NEEDS KNOWN. RESPIRATION 
IS EVEN AND UNLABORED. (R) UA IV LINE NOTED PATENT AND INTACT. NOTED W/ 1 EPISODE OF 
VOMITING DURING THE SHIFT. DENIED ANY PAIN. VERIFIED W/ THE PATIENT FOR HER INFORMED CONSENT 
FOR THE PERMACATH PLACEMENT FOR NELLY. PATIENT SIGNED THE INFORMED CONSENT AND UNDERSTOOD THE 
RISKS AND BENEFITS OF THE PROCEDURE. SHE WAS ALSO EDUCATED THAT SHE WILL BE KEPT NPO AFTER 
MIDNIGHT IN PREPARATION FOR THE PROCEDURE. REPORT GIVEN TO PM SHIFT NURSE FOR CONTINUITY OF 
CARE.

## 2018-05-21 NOTE — NUR
JOSUÉ RN NOTE:





RECEIVED PATIENT IS IN BED, AWAKE, ALERT AND ABLE TO MAKE HIS NEEDS KNOWN. RESPIRATION IS 
EVEN AND UNLABORED. (R) UA IV LINE NOTED PATENT AND INTACT. WILL MONITOR FOR VOMITING DURING 
THE SHIFT. DENIED ANY PAIN. CONTACTED JAMMIE TIRADO RE: THE PATIENT'S REQUEST TO HAVE A SOLID 
FOOD INSTEAD OF THE CLEAR LIQUID. AWAITING FOR CELESTINO'S RESPONSE. SENT A MESSAGE TO SEAN TERAN NP RE: THE PATIENT'S CURRENT DIET. AS PER SEAN, WAIT FOR THE RESPONSE OF JAMIME TIRADO. PATIENT MADE AWARE AND UNDERSTOOD. CALL LIGHT WITHIN REACH. NEEDS ANTICIPATED.

## 2018-05-21 NOTE — NUR
MS RN NOTE:



PATIENT VERBALIZED "I WANT TO EAT SOLID FOODS. I AM FEELING WEAK. I FEEL LIKE I NEED A SOLID 
FOOD TO GAIN MY ENERGY BACK." PATIENT DID NOT LIKE THE CREAM OF MUSHROOM SOUP AND REQUESTED 
TO THE KITCHEN FOR THE (STRAINED) CHICKEN BROTH SOUP. AS PER PATIENT, SHE WILL TRY TO EAT 
IT.

## 2018-05-21 NOTE — NUR
MS RN NOTE:



PATIENT ATE HALF OF THE STRAINED CHICKEN BROTH SOUP. AND STILL INSISTED THAT SHE WANTED TO 
EAT SOLID FOOD. AWAITING FOR JAMMIE TIRADO TO SPEAK W/ THE PATIENT RE: THE PATIENT'S DIET.

## 2018-05-21 NOTE — NUR
RN MS NOTES,



PAGED MD ON CALL TO INFORM HER REGARDING PATIENT HIGH BLOOD PRESSURE /95, HR 90, AND 
RECEIVED A N.O TO GIVE HYDRALAZINE 10MG VIA PO Q6HRS FOR SBP>160, ORDER NOTED AND CARRIED 
OUT.

## 2018-05-22 VITALS — DIASTOLIC BLOOD PRESSURE: 90 MMHG | SYSTOLIC BLOOD PRESSURE: 156 MMHG

## 2018-05-22 VITALS — SYSTOLIC BLOOD PRESSURE: 168 MMHG | DIASTOLIC BLOOD PRESSURE: 93 MMHG

## 2018-05-22 VITALS — DIASTOLIC BLOOD PRESSURE: 98 MMHG | SYSTOLIC BLOOD PRESSURE: 170 MMHG

## 2018-05-22 VITALS — DIASTOLIC BLOOD PRESSURE: 96 MMHG | SYSTOLIC BLOOD PRESSURE: 167 MMHG

## 2018-05-22 VITALS — DIASTOLIC BLOOD PRESSURE: 92 MMHG | SYSTOLIC BLOOD PRESSURE: 161 MMHG

## 2018-05-22 VITALS — SYSTOLIC BLOOD PRESSURE: 169 MMHG | DIASTOLIC BLOOD PRESSURE: 96 MMHG

## 2018-05-22 LAB
BASOPHILS # BLD AUTO: 0 /CMM (ref 0–0.2)
BASOPHILS NFR BLD AUTO: 0.2 % (ref 0–2)
BUN SERPL-MCNC: 60 MG/DL (ref 7–18)
CALCIUM SERPL-MCNC: 8 MG/DL (ref 8.5–10.1)
CHLORIDE SERPL-SCNC: 98 MMOL/L (ref 98–107)
CO2 SERPL-SCNC: 24 MMOL/L (ref 21–32)
CREAT SERPL-MCNC: 10.2 MG/DL (ref 0.6–1.3)
EOSINOPHIL NFR BLD AUTO: 0.9 % (ref 0–6)
GLUCOSE SERPL-MCNC: 104 MG/DL (ref 74–106)
HCT VFR BLD AUTO: 22 % (ref 33–45)
HGB BLD-MCNC: 7.3 G/DL (ref 11.5–14.8)
LYMPHOCYTES NFR BLD AUTO: 0.5 /CMM (ref 0.8–4.8)
LYMPHOCYTES NFR BLD AUTO: 8.5 % (ref 20–44)
MCHC RBC AUTO-ENTMCNC: 33 G/DL (ref 31–36)
MCV RBC AUTO: 89 FL (ref 82–100)
MONOCYTES NFR BLD AUTO: 0.6 /CMM (ref 0.1–1.3)
MONOCYTES NFR BLD AUTO: 10.1 % (ref 2–12)
NEUTROPHILS # BLD AUTO: 5.2 /CMM (ref 1.8–8.9)
NEUTROPHILS NFR BLD AUTO: 80.3 % (ref 43–81)
PLATELET # BLD AUTO: 131 /CMM (ref 150–450)
POTASSIUM SERPL-SCNC: 3.9 MMOL/L (ref 3.5–5.1)
RBC # BLD AUTO: 2.45 MIL/UL (ref 4–5.2)
RDW COEFFICIENT OF VARIATION: 17 (ref 11.5–15)
SODIUM SERPL-SCNC: 136 MMOL/L (ref 136–145)
WBC NRBC COR # BLD AUTO: 6.4 K/UL (ref 4.3–11)

## 2018-05-22 PROCEDURE — B513ZZA FLUOROSCOPY OF RIGHT JUGULAR VEINS, GUIDANCE: ICD-10-PCS | Performed by: INTERNAL MEDICINE

## 2018-05-22 PROCEDURE — 5A1D70Z PERFORMANCE OF URINARY FILTRATION, INTERMITTENT, LESS THAN 6 HOURS PER DAY: ICD-10-PCS

## 2018-05-22 PROCEDURE — 05HM33Z INSERTION OF INFUSION DEVICE INTO RIGHT INTERNAL JUGULAR VEIN, PERCUTANEOUS APPROACH: ICD-10-PCS | Performed by: THORACIC SURGERY (CARDIOTHORACIC VASCULAR SURGERY)

## 2018-05-22 RX ADMIN — SODIUM CHLORIDE SCH MG: 9 INJECTION, SOLUTION INTRAVENOUS at 09:48

## 2018-05-22 RX ADMIN — FERROUS SULFATE TAB 325 MG (65 MG ELEMENTAL FE) SCH MG: 325 (65 FE) TAB at 09:48

## 2018-05-22 RX ADMIN — Medication SCH MG: at 09:48

## 2018-05-22 RX ADMIN — Medication SCH MG: at 12:17

## 2018-05-22 RX ADMIN — Medication SCH G: at 17:02

## 2018-05-22 RX ADMIN — SODIUM CHLORIDE SCH MG: 9 INJECTION, SOLUTION INTRAVENOUS at 17:02

## 2018-05-22 RX ADMIN — DEXTROSE MONOHYDRATE SCH MLS/HR: 50 INJECTION, SOLUTION INTRAVENOUS at 12:50

## 2018-05-22 RX ADMIN — Medication SCH G: at 09:48

## 2018-05-22 RX ADMIN — CALCITRIOL SCH MCG: 0.25 CAPSULE, GELATIN COATED ORAL at 09:48

## 2018-05-22 RX ADMIN — Medication SCH MG: at 17:02

## 2018-05-22 RX ADMIN — ACETAMINOPHEN PRN MG: 325 TABLET ORAL at 21:21

## 2018-05-22 RX ADMIN — Medication SCH G: at 12:17

## 2018-05-22 RX ADMIN — ACETAMINOPHEN PRN MG: 325 TABLET ORAL at 14:35

## 2018-05-22 RX ADMIN — AMLODIPINE BESYLATE SCH MG: 5 TABLET ORAL at 09:48

## 2018-05-22 NOTE — NUR
RN MS NOTES,



MD ON CALL REPLIED WITH NEW ORDER FOR CLONIDINE 0.1MG/24HRS/WEEKLY PATCH VIA TD, ORDER NOTED 
AND CARRIED OUT. WILL CONTINUE TO MONITOR BP CLOSELY.

## 2018-05-22 NOTE — NUR
MS RN NOTES,



RECEIVED PATIENT IN BED, AWAKE ALERT AND ORIENTED, ABLE TO COMMUNICATE NEEDS, BREATHING EVEN 
AND UNLABORED ON RA O2 SATURATION > 96%, NO C/O PAIN, DENIES NAUSEA AT THIS TIME, NO 
EPISODES  OF VOMITING AT THIS TIME, FLORY MIDLINE PATENT AND INTACT, S/P LCW PERM CATH FOR HD 
ACCES, NO ACTIVE BLEEDING NOTED AT SITE, FLORY MIDLINE PATENT AND INTACT,  S/P RIGHT FEMORAL 
CATH  REMOVAL, NO BLEEDING NOTED AT SITE, DRESSING DRY AND CLEAN,  ALL NEEDS PROVIDED, BED 
LOCKED AND IN LOWEST POSITION, CALL LIGHT W/I REACH, WILL CONTINUE TO MONITOR CLOSELY

## 2018-05-22 NOTE — NUR
RN MS  NOTES,



PATIENT IN BED, SLEEPING AT THIS TIME, BUT EASILY AROUSABLE , BREATHING EVEN AND UNLABORED 
ON RA O2 SATURATION > 96%, NO C/O PAIN, DENIES NAUSEA AT THIS TIME, NO EPISODES  OF VOMITING 
THROUGHOUT THE NIGHT,,  FLORY MIDLINE PATENT AND INTACT, RIGHT FEMORAL CATH FOR HAD INTACT, NO 
BLEEDING NOTED AT THIS TIME, DRESSING DRY ND CLEAN,  NPO SINCE MIDNIGHT  FOR THE PLACEMENT 
OF PERMANENT CATH FOR HD IN THIS MORNING, ALL NEEDS PROVIDED, BED LOCKED AND IN LOWEST 
POSITION, CALL LIGHT W/I REACH, EPISODES OF HIGH BLOOD PRESSURE THROUGHOUT THE NIGHT, 
MEDICATION WAS ADMINISTERED AS ORDERED, WILL ENDORSE CONTINUITY OF CARE TO ONCOMING NURSE.

## 2018-05-22 NOTE — NUR
MS RN NOTES,



BP CHECKED AT THIS TIME AFTER THE ADMINISTRATION OF HYDRALAZINE 10MG PRN,A ND BP AT [facility] 
TIME 158/93, HR 80, WILL CONTINUE TO MONITOR CLOSELY.

## 2018-05-22 NOTE — NUR
MED SURG RN NOTE

PATIENT C/O SEROSANGUINEOUS KUSH FROM NEW INSERTION OF JUGULAR HD CATH. REENFORCED GAUZE 
AND CHANGE DRESSING USING ASEPTIC TECHNIQUE. CHARGE THAO RN AWARE. PATIENT CONTENT WAS VERY  
UNCOMFORTABLE BEFORE REENFORCEMENT.

## 2018-05-22 NOTE — NUR
RN NOTE



PATIENT REMAINED STABLE THROUGHOUT SHIFT. NO ACUTE DISTRESSED NOTED. WILL ENDORSE TO NEXT 
SHIFT TO CONTINUE CONTINUITY OF CARE.

## 2018-05-22 NOTE — NUR
RN NOTE



RECEIVED REPORT FOR LETY FROM REC[facility]ERY. PATIENT IS STABLE CONDITION FOR S/P RIGHT 
PERMACATH PLACEMENT. RECEIVED PATIENT ALERT AND ORIENTED X3, PATIENT IS ABLE TO MAKE THINGS 
KNOWN AND VERBALIZE NEEDS. BREATHING EVEN AND UNLABORED WITH NO DISTRESS NOTED. NO BLEEDING 
NOTED ON RIGHT JUGULAR SITE. DRESSING CLEAN AND INTACT. WILL CONTINUE TO MONITOR. RECEIVED 
ORDERS TO RESUME DIET AND MEDS FOR PATIENT AND REMOVAL OF RIGHT GROIN HD CATH. PATIENT 
AWARE. CARRIED AND NOTED. V/S 169/92, RR 20, T 97.2, HR 92, PAIN 0/10, O2 SAT 99%. WILL 
CONTINUE TO MONITOR CLOSELY

## 2018-05-23 VITALS — SYSTOLIC BLOOD PRESSURE: 158 MMHG | DIASTOLIC BLOOD PRESSURE: 85 MMHG

## 2018-05-23 VITALS — DIASTOLIC BLOOD PRESSURE: 96 MMHG | SYSTOLIC BLOOD PRESSURE: 165 MMHG

## 2018-05-23 VITALS — SYSTOLIC BLOOD PRESSURE: 162 MMHG | DIASTOLIC BLOOD PRESSURE: 89 MMHG

## 2018-05-23 LAB
BASOPHILS # BLD AUTO: 0 /CMM (ref 0–0.2)
BASOPHILS NFR BLD AUTO: 0.1 % (ref 0–2)
BUN SERPL-MCNC: 47 MG/DL (ref 7–18)
CALCIUM SERPL-MCNC: 8 MG/DL (ref 8.5–10.1)
CHLORIDE SERPL-SCNC: 95 MMOL/L (ref 98–107)
CO2 SERPL-SCNC: 26 MMOL/L (ref 21–32)
CREAT SERPL-MCNC: 8.4 MG/DL (ref 0.6–1.3)
EOSINOPHIL NFR BLD AUTO: 1 % (ref 0–6)
GLUCOSE SERPL-MCNC: 99 MG/DL (ref 74–106)
HCT VFR BLD AUTO: 22 % (ref 33–45)
HGB BLD-MCNC: 7.4 G/DL (ref 11.5–14.8)
LYMPHOCYTES NFR BLD AUTO: 0.7 /CMM (ref 0.8–4.8)
LYMPHOCYTES NFR BLD AUTO: 11.4 % (ref 20–44)
MCHC RBC AUTO-ENTMCNC: 33 G/DL (ref 31–36)
MCV RBC AUTO: 90 FL (ref 82–100)
MONOCYTES NFR BLD AUTO: 0.6 /CMM (ref 0.1–1.3)
MONOCYTES NFR BLD AUTO: 10.2 % (ref 2–12)
NEUTROPHILS # BLD AUTO: 4.7 /CMM (ref 1.8–8.9)
NEUTROPHILS NFR BLD AUTO: 77.3 % (ref 43–81)
PLATELET # BLD AUTO: 135 /CMM (ref 150–450)
POTASSIUM SERPL-SCNC: 4.1 MMOL/L (ref 3.5–5.1)
RBC # BLD AUTO: 2.49 MIL/UL (ref 4–5.2)
RDW COEFFICIENT OF VARIATION: 17.1 (ref 11.5–15)
SODIUM SERPL-SCNC: 132 MMOL/L (ref 136–145)
WBC NRBC COR # BLD AUTO: 6.1 K/UL (ref 4.3–11)

## 2018-05-23 RX ADMIN — Medication SCH MG: at 12:20

## 2018-05-23 RX ADMIN — CALCITRIOL SCH MCG: 0.25 CAPSULE, GELATIN COATED ORAL at 08:17

## 2018-05-23 RX ADMIN — ACETAMINOPHEN PRN MG: 325 TABLET ORAL at 04:14

## 2018-05-23 RX ADMIN — FERROUS SULFATE TAB 325 MG (65 MG ELEMENTAL FE) SCH MG: 325 (65 FE) TAB at 08:17

## 2018-05-23 RX ADMIN — Medication SCH G: at 16:35

## 2018-05-23 RX ADMIN — ACETAMINOPHEN PRN MG: 325 TABLET ORAL at 14:57

## 2018-05-23 RX ADMIN — SODIUM CHLORIDE SCH MG: 9 INJECTION, SOLUTION INTRAVENOUS at 16:35

## 2018-05-23 RX ADMIN — AMLODIPINE BESYLATE SCH MG: 5 TABLET ORAL at 08:17

## 2018-05-23 RX ADMIN — SODIUM CHLORIDE SCH MG: 9 INJECTION, SOLUTION INTRAVENOUS at 08:17

## 2018-05-23 RX ADMIN — SEVELAMER CARBONATE SCH GM: 800 POWDER, FOR SUSPENSION ORAL at 13:14

## 2018-05-23 RX ADMIN — Medication SCH G: at 12:20

## 2018-05-23 RX ADMIN — Medication SCH MG: at 08:17

## 2018-05-23 RX ADMIN — Medication SCH G: at 08:17

## 2018-05-23 RX ADMIN — SEVELAMER CARBONATE SCH GM: 800 POWDER, FOR SUSPENSION ORAL at 17:10

## 2018-05-23 RX ADMIN — Medication SCH MG: at 16:35

## 2018-05-23 NOTE — NUR
MS RN NOTES,



PATIENT IN BED SLEEPING BUT EASILY AROUSABLE,  BREATHING EVEN AND UNLABORED ON RA O2 
SATURATION > 96%, NO C/O PAIN, DENIES NAUSEA AT THIS TIME, NO EPISODES  OF VOMITING AT THIS 
TIME, S/P LCW PERM CATH FOR HD ACCESS,  NO ACTIVE BLEEDING NOTED AT SITE, FLORY MIDLINE PATENT 
AND INTACT,  S/P RIGHT FEMORAL CATH  REMOVAL, NO BLEEDING NOTED AT SITE, DRESSING DRY AND 
CLEAN,  ALL NEEDS PROVIDED, BED LOCKED AND IN LOWEST POSITION, CALL LIGHT W/I REACH, NO 
SIGNIFICANT CHANGE OF CONDITION, EPISODES OF HIGH BLOOD PRESSURE AS LAST NIGHT MEDICATION 
WAS ADMINISTERED AS ORDERED, WILL ENDORSE CONTINUITY OF CARE TO ONCOMING NURSE.

## 2018-05-23 NOTE — NUR
RN NOTE



RECEIVED PATIENT AWAKE, ALERT AND ORIENTED X3, PATIENT IS ABLE TO MAKE THINGS KNOWN AND 
VERBALIZE NEEDS. BREATHING EVEN AND UNLABORED WITH NO DISTRESS NOTED. PATIENT IS ABLE TO 
AMBULATE WITH ASSIST. S/P RIGHT PERMACATH PLACEMENT WITH NO BLEEDING NOTED ON RIGHT JUGULAR 
SITE. DRESSING CLEAN AND INTACT. WILL CONTINUE TO MONITOR. BED LOCKED AND LOW POSITION. 
PLACED CALL LIGHT WITHIN REACH.  WILL CONTINUE TO MONITOR CLOSELY

## 2018-05-23 NOTE — NUR
MS RN NOTES,



RECHECKED BP AT THIS TIME AFTER THE ADMINISTRATION OF HYDRALAZINE 10MG AS ORDERED, AND BP 
157/89, HR 81.

## 2018-05-23 NOTE — NUR
RN NOTE



53 YEAR OLD FEMALE DISCHARGED TO HOME IN STABLE CONDITION. COMPLIANT WITH MEDICATIONS, 
COOPERATIVE WITH TREATMENT PLANS. MEDICAL TREATMENT PLANS DEFERRED FOR CONTINUAL MONITORING. 
EDUCATED PATIENT ABOUT AFTER CARE PLAN AND COPIES PROVIDED. RETURNED PERSONAL BELONGINGS TO 
PATIENT. MEDICATIONS RECONCILED. PATIENT SIGNED DISCHARGE PAPERWORK. REMOVED RIGHT UPPER 
MIDLINE, CATHETER INTACT. PATIENT LEFT THE UNIT AT 1745 VIA PRIVATE CAR WITH HER SISTER.

## 2018-09-12 ENCOUNTER — HOSPITAL ENCOUNTER (INPATIENT)
Dept: HOSPITAL 54 - ER | Age: 53
LOS: 7 days | Discharge: NURSING, REHAB OR LONG-TERM CARE | DRG: 64 | End: 2018-09-19
Attending: INTERNAL MEDICINE | Admitting: INTERNAL MEDICINE
Payer: MEDICARE

## 2018-09-12 VITALS — WEIGHT: 143 LBS | BODY MASS INDEX: 22.44 KG/M2 | HEIGHT: 67 IN
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
## 2018-09-12 DIAGNOSIS — D63.1: ICD-10-CM

## 2018-09-12 DIAGNOSIS — F32.9: ICD-10-CM

## 2018-09-12 DIAGNOSIS — Q61.3: ICD-10-CM

## 2018-09-12 DIAGNOSIS — T82.49XA: ICD-10-CM

## 2018-09-12 DIAGNOSIS — N18.6: ICD-10-CM

## 2018-09-12 DIAGNOSIS — I63.9: Primary | ICD-10-CM

## 2018-09-12 DIAGNOSIS — Z86.73: ICD-10-CM

## 2018-09-12 DIAGNOSIS — R47.81: ICD-10-CM

## 2018-09-12 DIAGNOSIS — I12.0: ICD-10-CM

## 2018-09-12 DIAGNOSIS — R29.706: ICD-10-CM
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
## 2018-09-12 DIAGNOSIS — G81.91: ICD-10-CM

## 2018-09-12 DIAGNOSIS — Z99.2: ICD-10-CM

## 2018-09-12 LAB
APTT PPP: 27 SEC (ref 23–34)
BASOPHILS # BLD AUTO: 0 /CMM (ref 0–0.2)
BASOPHILS NFR BLD AUTO: 0.5 % (ref 0–2)
BUN SERPL-MCNC: 25 MG/DL (ref 7–18)
CALCIUM SERPL-MCNC: 9.2 MG/DL (ref 8.5–10.1)
CHLORIDE SERPL-SCNC: 97 MMOL/L (ref 98–107)
CHOLEST SERPL-MCNC: 244 MG/DL (ref ?–200)
CO2 SERPL-SCNC: 26 MMOL/L (ref 21–32)
CREAT SERPL-MCNC: 5.3 MG/DL (ref 0.6–1.3)
EOSINOPHIL NFR BLD AUTO: 3.2 % (ref 0–6)
GLUCOSE SERPL-MCNC: 98 MG/DL (ref 74–106)
HCT VFR BLD AUTO: 30 % (ref 33–45)
HDLC SERPL-MCNC: 88 MG/DL (ref 40–60)
HGB BLD-MCNC: 9.7 G/DL (ref 11.5–14.8)
INR PPP: 0.93 (ref 0.87–1.13)
LDLC SERPL DIRECT ASSAY-MCNC: 142 MG/DL (ref 0–99)
LYMPHOCYTES NFR BLD AUTO: 0.7 /CMM (ref 0.8–4.8)
LYMPHOCYTES NFR BLD AUTO: 14 % (ref 20–44)
MCH RBC QN AUTO: 30 PG (ref 26–33)
MCHC RBC AUTO-ENTMCNC: 33 G/DL (ref 31–36)
MCV RBC AUTO: 94 FL (ref 82–100)
MONOCYTES NFR BLD AUTO: 0.6 /CMM (ref 0.1–1.3)
MONOCYTES NFR BLD AUTO: 13.6 % (ref 2–12)
NEUTROPHILS # BLD AUTO: 3.2 /CMM (ref 1.8–8.9)
NEUTROPHILS NFR BLD AUTO: 68.7 % (ref 43–81)
PLATELET # BLD AUTO: 181 /CMM (ref 150–450)
POTASSIUM SERPL-SCNC: 4.3 MMOL/L (ref 3.5–5.1)
RBC # BLD AUTO: 3.19 MIL/UL (ref 4–5.2)
RDW COEFFICIENT OF VARIATION: 15.7 (ref 11.5–15)
SODIUM SERPL-SCNC: 129 MMOL/L (ref 136–145)
TRIGL SERPL-MCNC: 99 MG/DL (ref 30–150)
TROPONIN I SERPL-MCNC: < 0.017 NG/ML (ref 0–0.06)
WBC NRBC COR # BLD AUTO: 4.7 K/UL (ref 4.3–11)

## 2018-09-12 PROCEDURE — A6402 STERILE GAUZE <= 16 SQ IN: HCPCS

## 2018-09-12 PROCEDURE — A4606 OXYGEN PROBE USED W OXIMETER: HCPCS

## 2018-09-12 PROCEDURE — C1750 CATH, HEMODIALYSIS,LONG-TERM: HCPCS

## 2018-09-12 PROCEDURE — A4565 SLINGS: HCPCS

## 2018-09-12 PROCEDURE — Z7610: HCPCS

## 2018-09-12 NOTE — NUR
TO BED 8 COMPLAINING OF RT SIDED ARM AND LEG WEAKNESS THAT BEGAN AROUND "7:30 
AM" THIS MORNING. PT AA/OX4 SPEAKING FULL SENTENCES. NO S/S SOB. EQUAL FACIAL 
SYMMETRY. NO N/V. SKIN PINK, WARM, DRY. DIALYSIS SHUNT TO UPPER RIGHT CHEST AND 
RECENT PLACEMENT TO LT ELBOW. DIALYSIS SHUNT. NAD. VSS. STABLE CONDITION. WILL 
CONTINUE TO MONITOR.

## 2018-09-12 NOTE — NUR
Patient is resting comfortably in bed. VSS. NAD. SAFETY MEASURES IN PLACE. CALL 
LIGHT WITHIN REACH.

## 2018-09-13 VITALS — DIASTOLIC BLOOD PRESSURE: 90 MMHG | SYSTOLIC BLOOD PRESSURE: 153 MMHG

## 2018-09-13 VITALS — DIASTOLIC BLOOD PRESSURE: 79 MMHG | SYSTOLIC BLOOD PRESSURE: 158 MMHG

## 2018-09-13 VITALS — DIASTOLIC BLOOD PRESSURE: 97 MMHG | SYSTOLIC BLOOD PRESSURE: 180 MMHG

## 2018-09-13 VITALS — DIASTOLIC BLOOD PRESSURE: 94 MMHG | SYSTOLIC BLOOD PRESSURE: 163 MMHG

## 2018-09-13 VITALS — SYSTOLIC BLOOD PRESSURE: 155 MMHG | DIASTOLIC BLOOD PRESSURE: 93 MMHG

## 2018-09-13 VITALS — SYSTOLIC BLOOD PRESSURE: 157 MMHG | DIASTOLIC BLOOD PRESSURE: 97 MMHG

## 2018-09-13 VITALS — SYSTOLIC BLOOD PRESSURE: 185 MMHG | DIASTOLIC BLOOD PRESSURE: 95 MMHG

## 2018-09-13 VITALS — DIASTOLIC BLOOD PRESSURE: 93 MMHG | SYSTOLIC BLOOD PRESSURE: 152 MMHG

## 2018-09-13 LAB
BASOPHILS # BLD AUTO: 0 /CMM (ref 0–0.2)
BASOPHILS NFR BLD AUTO: 1 % (ref 0–2)
EOSINOPHIL NFR BLD AUTO: 1.9 % (ref 0–6)
HCT VFR BLD AUTO: 30 % (ref 33–45)
HGB BLD-MCNC: 10 G/DL (ref 11.5–14.8)
LYMPHOCYTES NFR BLD AUTO: 0.6 /CMM (ref 0.8–4.8)
LYMPHOCYTES NFR BLD AUTO: 12.8 % (ref 20–44)
MAGNESIUM SERPL-MCNC: 2.1 MG/DL (ref 1.8–2.4)
MCH RBC QN AUTO: 31 PG (ref 26–33)
MCHC RBC AUTO-ENTMCNC: 34 G/DL (ref 31–36)
MCV RBC AUTO: 92 FL (ref 82–100)
MONOCYTES NFR BLD AUTO: 0.6 /CMM (ref 0.1–1.3)
MONOCYTES NFR BLD AUTO: 11.9 % (ref 2–12)
NEUTROPHILS # BLD AUTO: 3.5 /CMM (ref 1.8–8.9)
NEUTROPHILS NFR BLD AUTO: 72.4 % (ref 43–81)
PHOSPHATE SERPL-MCNC: 3.2 MG/DL (ref 2.5–4.9)
PLATELET # BLD AUTO: 186 /CMM (ref 150–450)
RBC # BLD AUTO: 3.2 MIL/UL (ref 4–5.2)
RDW COEFFICIENT OF VARIATION: 15.4 (ref 11.5–15)
WBC NRBC COR # BLD AUTO: 4.8 K/UL (ref 4.3–11)

## 2018-09-13 PROCEDURE — 5A1D70Z PERFORMANCE OF URINARY FILTRATION, INTERMITTENT, LESS THAN 6 HOURS PER DAY: ICD-10-PCS | Performed by: INTERNAL MEDICINE

## 2018-09-13 RX ADMIN — Medication SCH MG: at 18:04

## 2018-09-13 RX ADMIN — Medication SCH MG: at 13:22

## 2018-09-13 RX ADMIN — LORAZEPAM PRN MG: 2 INJECTION INTRAMUSCULAR; INTRAVENOUS at 20:49

## 2018-09-13 RX ADMIN — SODIUM CHLORIDE PRN MLS/HR: 9 INJECTION, SOLUTION INTRAVENOUS at 11:45

## 2018-09-13 RX ADMIN — ATORVASTATIN CALCIUM SCH MG: 40 TABLET, FILM COATED ORAL at 21:03

## 2018-09-13 RX ADMIN — CLONIDINE HYDROCHLORIDE PRN MG: 0.1 TABLET ORAL at 03:30

## 2018-09-13 NOTE — NUR
RN NOTES

SPOKE WITH DR. ALMODOVAR FROM [facility]. AS PER DR. ALMODOVAR, ORDER CT ANGIOGRAM; NOTED AND 
CARRIED OUT.

## 2018-09-13 NOTE — NUR
MESHA PATEL: TELESTROKE TECH CALLED TO INFORM THAT DR. ALMODOVAR HAS REVIEWED THE RESULTS OF 
THE CTA. NO LARGE VESSEL OCCLUSION AND NO CHANGE FROM PREVIOUS STUDY. NO NEW ORDERS OR 
RECOMMENDATIONS NEEDED AT THIS TIME.

## 2018-09-13 NOTE — NUR
RADIOLOGY TEAM ARRIVED TO  PATIENT FOR CTA OF BRAIN AND CAROTID. PATIENT APPEARS VERY 
ANXIOUS AND RESTLESS. PATIENT STATES SHE NEEDS TO GET OUT OF BED AND STRETCH. EXPLAINED TO 
THE PATIENT THAT SHE IS CURRENTLY EXPERIENCING RIGHT SIDED WEAKNESS AND IT  IS NOT SAFE FOR 
HER TO WALK. HEART RATE . INFORMED DR. EVELYN ALBARADO. ATIVAN 0.5MG IVP WAS ORDERED. ATIVAN 
0.5MG ADMINISTERED.

## 2018-09-13 NOTE — NUR
ICU RN - REC'D P/C FROM [facility]-CHARGE RN IN 3WEST. PT: ELOISE HASAN-MOHSIN-RM#[id number]. WAS 
ASKED TO PERFORM A NEURO ASSESSMENT ON PT.TO R/O STROKE. PT. WAS ADMITTED W/HTN. CLEARED FOR 
STROKE IN E.R. PT'S LAST WELLNESS CHECK WAS AT 2AM, PT. WALKED TO BATHROOM, RUE STILL NUMB, 
& SHE COULD STILL LIFT RUE, BUT W/SOME WEAKNESS. PT. SLEPT X 4 HRS, WOKE UP AT 6AM, COULD 
NOT MOVE RUE/RLE AT ALL. COULD NOT LIFT ON COMMAND, BUT HAS GOOD SENSATION WHEN TOUCHED. 
CODE STROKE CALLED IMMEDIATELY. ROBOT BROUGHT UP STAT,  GLUCOSE WAS #109, ACC.WT. #146. 
SPEECH SLURRED, BUT AUDIBLE. SBP'S IN 'S. NO H/A OR PAIN ANYWHERE NOTED. PT. TAKEN TO 
CT W/RADIOLOGY TEAM. EVENTUALLY, CT READ. NO CHANGES WITH ADMISSION CT & THIS CT. PT. HAD A 
CT W/CONTRAST EVENTUALLY ORDERED. STROKE ASSESSMENT DONE 3 X'S BY ME. NO CHANGES EXCEPT W/ 
THE WORD " FIFTY-FIFTY". PT. SLURRED THIS WORD TO THE POINT IT IS WORTH DOCUMENTING. I LEFT 
AS NEW PRIMARY RN CAME ON SHIFT. [facility] NOW 8:20 
WHEN I GOT BACK TO FLOOR & PT. WAS LEFT GETTING PREPPED TO GO BACK TO CAT SCAN. CONT.POC.

## 2018-09-13 NOTE — NUR
CODE STROKE WAS CALLED FOR PATIENT AT 0634. STROKE PROTOCOL WAS INITIATED. PATIENT CURRENTLY 
GETTING A CT SCAN OF THE HEAD. WILL CONTINUE TO MONITOR WHEN PATIENT RETURNS TO THE UNIT.

## 2018-09-13 NOTE — NUR
RN NOTES

PATIENT CALLED TO SAY THAT SHE IS HAVING DIFFICULTY MOVING HER RIGHT ARM AND RIGHT LEG. 
PATIENT AO X 3, WITH CLEAR SPEECH. NO FACIAL DROOPING NOTED. RIGHT ARM FLACCID. RIGHT LEG 
ABLE TO PRESS AGAINST NURSE'S HAND, BUT VERY WEAKLY. ICU CALLED.

## 2018-09-13 NOTE — NUR
RN NOTES

DR. SCALES NOTIFIED THAT PATIENT WANTS ATIVAN TO BE MORE FREQUENT TO MANAGE HER ANXIETY. 
DR. SCALES ORDERED TO CHANGE FREQUENCY TO Q 3 HOURS, NOTED AND CARRIED OUT. PATIENT MADE 
AWARE.

## 2018-09-13 NOTE — NUR
RN NOTES

PER DR. EVELYN ALBARADO, PATIENT DOES NOT NEED DIALYSIS AFTER CT ANGIOGRAM. KELSEY IN RADIOLOGY 
INFORMED.

## 2018-09-13 NOTE — NUR
RN NOTES

RECEIVED PATIENT IN BED AWAKE, AO X 3, ABLE TO MAKE NEEDS KNOWN. NO ACUTE DISTRESS NOTED. 
DENIES ANY PAIN AT THIS TIME. IV SITE PATENT, INTACT; IVF INFUSING AS ORDERED. SEVERE 
WEAKNESS NOTED ON RUE AND RLE. SAFETY PRECAUTIONS GIVEN. ON LOW BED WITH BILATERAL UPPER 
SIDE RAILS UP. CALL BELL WITHIN EASY REACH. WILL CONTINUE TO MONITOR.

## 2018-09-13 NOTE — NUR
PAGED KEVIN PICHARDO CHANGE OF CONDITION AND CODE STROKE. PT WAS TRANSFERRED DOWN TO CT W/ 
ICU NURSE AND SUPERVISOR UNDER ACLS PROTOCOL.

## 2018-09-13 NOTE — NUR
INFORMED DR. ALMODOVAR THAT PATIENT RETURNED TO THE UNIT FROM CTA. DR. ALMODOVAR WILL REVIEW THE 
RESULTS.

## 2018-09-13 NOTE — NUR
RECEIVED A CALL FROM DR. [place] RADIOLOGIST REPORTING NO CHANGE IN BRAIN CT FROM THE PREVIOUS 
CT. CHARGE NURSE, [facility] AND ICU NURSE MADE AWARE,

## 2018-09-14 VITALS — SYSTOLIC BLOOD PRESSURE: 195 MMHG | DIASTOLIC BLOOD PRESSURE: 115 MMHG

## 2018-09-14 VITALS — DIASTOLIC BLOOD PRESSURE: 94 MMHG | SYSTOLIC BLOOD PRESSURE: 162 MMHG

## 2018-09-14 VITALS — DIASTOLIC BLOOD PRESSURE: 84 MMHG | SYSTOLIC BLOOD PRESSURE: 165 MMHG

## 2018-09-14 VITALS — DIASTOLIC BLOOD PRESSURE: 111 MMHG | SYSTOLIC BLOOD PRESSURE: 190 MMHG

## 2018-09-14 VITALS — SYSTOLIC BLOOD PRESSURE: 186 MMHG | DIASTOLIC BLOOD PRESSURE: 105 MMHG

## 2018-09-14 LAB
ALBUMIN SERPL BCP-MCNC: 3.1 G/DL (ref 3.4–5)
ALP SERPL-CCNC: 49 U/L (ref 46–116)
ALT SERPL W P-5'-P-CCNC: 15 U/L (ref 12–78)
AST SERPL W P-5'-P-CCNC: 14 U/L (ref 15–37)
BASOPHILS # BLD AUTO: 0 /CMM (ref 0–0.2)
BASOPHILS NFR BLD AUTO: 0.7 % (ref 0–2)
BILIRUB SERPL-MCNC: 0.3 MG/DL (ref 0.2–1)
BUN SERPL-MCNC: 29 MG/DL (ref 7–18)
CALCIUM SERPL-MCNC: 9.1 MG/DL (ref 8.5–10.1)
CHLORIDE SERPL-SCNC: 100 MMOL/L (ref 98–107)
CO2 SERPL-SCNC: 25 MMOL/L (ref 21–32)
CREAT SERPL-MCNC: 6.5 MG/DL (ref 0.6–1.3)
EOSINOPHIL NFR BLD AUTO: 5.2 % (ref 0–6)
GLUCOSE SERPL-MCNC: 94 MG/DL (ref 74–106)
HCT VFR BLD AUTO: 31 % (ref 33–45)
HGB BLD-MCNC: 9.8 G/DL (ref 11.5–14.8)
LYMPHOCYTES NFR BLD AUTO: 0.7 /CMM (ref 0.8–4.8)
LYMPHOCYTES NFR BLD AUTO: 14.8 % (ref 20–44)
MAGNESIUM SERPL-MCNC: 2.2 MG/DL (ref 1.8–2.4)
MCH RBC QN AUTO: 30 PG (ref 26–33)
MCHC RBC AUTO-ENTMCNC: 32 G/DL (ref 31–36)
MCV RBC AUTO: 95 FL (ref 82–100)
MONOCYTES NFR BLD AUTO: 0.6 /CMM (ref 0.1–1.3)
MONOCYTES NFR BLD AUTO: 12.3 % (ref 2–12)
NEUTROPHILS # BLD AUTO: 3.4 /CMM (ref 1.8–8.9)
NEUTROPHILS NFR BLD AUTO: 67 % (ref 43–81)
PHOSPHATE SERPL-MCNC: 3.7 MG/DL (ref 2.5–4.9)
PLATELET # BLD AUTO: 214 /CMM (ref 150–450)
POTASSIUM SERPL-SCNC: 4.7 MMOL/L (ref 3.5–5.1)
PROT SERPL-MCNC: 6.4 G/DL (ref 6.4–8.2)
RBC # BLD AUTO: 3.27 MIL/UL (ref 4–5.2)
RDW COEFFICIENT OF VARIATION: 15.8 (ref 11.5–15)
SODIUM SERPL-SCNC: 136 MMOL/L (ref 136–145)
WBC NRBC COR # BLD AUTO: 5 K/UL (ref 4.3–11)

## 2018-09-14 RX ADMIN — ATORVASTATIN CALCIUM SCH MG: 40 TABLET, FILM COATED ORAL at 22:07

## 2018-09-14 RX ADMIN — Medication SCH MG: at 13:03

## 2018-09-14 RX ADMIN — LORAZEPAM PRN MG: 2 INJECTION INTRAMUSCULAR; INTRAVENOUS at 19:53

## 2018-09-14 RX ADMIN — ASPIRIN SCH MG: 325 TABLET, FILM COATED ORAL at 08:30

## 2018-09-14 RX ADMIN — CLONIDINE HYDROCHLORIDE PRN MG: 0.1 TABLET ORAL at 20:39

## 2018-09-14 RX ADMIN — Medication SCH MG: at 17:15

## 2018-09-14 RX ADMIN — SODIUM CHLORIDE PRN MLS/HR: 9 INJECTION, SOLUTION INTRAVENOUS at 13:04

## 2018-09-14 RX ADMIN — Medication SCH MG: at 08:30

## 2018-09-14 NOTE — NUR
RN NOTES:

ASSISTED PT TO THE USE BED SIDE COMMODE, SHE STATED SHE'S BEEN USING BED SIDE COMMODE DURING 
THE DAY, NOTED PT HAS RIGHT SIDED WEAKNESS UPPER AND LOWER EXTREMITIES, PLACED THE COMMODE 
CLOSER TO THE PT, HOWEVER PT HAD DIFFICULTY WITH PIVOTING, ALMOST LOST HER BALANCE, RN ABLE 
TO HOLD THE PT AND PREVENT PT FROM FALLING OR SLIDING/SLIPPING, CALLED FOR HELP, YARITZA ROBERTSON CAME TO HELP AND PAULO SOSA, PT REALLY INSISTING TO USE BED SIDE COMMODE, DESPITE US 
OFFERING HER TO USE BED MALHOTRA, PT INSISTED THAT SHE JUST NEEDS ASSISTANCE AND SHE WANT TO USE 
COMMODE NO MATTER WHAT. 2 RN AND ONE  ASSISTED PT TO USE COMMODE, ALTHOUGH ITS 
REALLY DIFFICULT FOR HER TO MOVE, PT ABLE TO VOID IN THE COMMODE. EXPLAINED TO PT ABOUT 
SAFETY AND FALL PREVENTION, BUT NO MATTER WHAT PT REALLY STICK TO HER DECISION OF USING BED 
SIDE COMMODE. SAFETY PRECAUTIONS FOR FALL ENGAGED, CALL LIGHT IN REACH, BED ALARM IN SECOND 
ZONE SECURED, WILL SIT OUTSIDE PT'S ROOM TO FURTHER MONITOR PT. RN CHARGE MADE AWARE OF PT 
CONDITION AND INCIDENT

## 2018-09-14 NOTE — NUR
MS RN CLOSING NOTE



PT IN BED, AAOX3. DENIES N/V, CHEST PAIN, SOB, AND PAIN AT THIS TIME. BREATHING IS EVEN AND 
UNLABORED ON ROOM AIR. SEVERE WEAKNESS NOTED IN RUE AND RLE. LEFT AV SHUNT IS POSITIVE FOR 
BRUIT AND THRILL, RIGHT AC #20 G IV IS INFUSING NS @ 50ML/HR WITHOUT SIGNS OF REDNESS, 
SWELLING, OR PAIN. SAFETY PRECAUTIONS MAINTAINED THROUGHOUT THE SHIFT. BED IS LOCKED AND IN 
LOWEST POSITION, SIDE RAILS UP X2, CALL LIGHT IS WITHIN REACH. WILL ENDORSE TO NIGHT SHIFT 
RN FOR CONTINUITY OF CARE.

## 2018-09-14 NOTE — NUR
MS RN PATIENT BACK ON UNIT



PT BACK FROM MRI. NO SIGNS OF ACUTE DISTRESS. WILL CONTINUE TO MONITOR.

## 2018-09-14 NOTE — NUR
RN NOTES

PATIENT IN BED ASLEEP, EASILY AROUSABLE. RESPIRATIONS EVEN. NO SIGNS OF PAIN NOTED. RUE AND 
RLE CONTINUES TO BE WEAK, BUT IMPROVED FROM YESTERDAY MORNING. PATIENT SLEPT APPROXIMATELY 8 
HOURS LAST NIGHT. HOB FLAT AS PER DR. GRIMALDO'S ORDERS. DUE MED GIVEN WITH NO ASE NOTED. IVF 
INFUSING AS ORDERED. NEEDS ATTENDED. ASSISTED TO BSC. SAFETY PRECAUTIONS AND COMFORT 
MEASURES IN PLACE. WILL GIVE REPORT TO DAY SHIFT FOR CONTINUITY OF CARE.

## 2018-09-14 NOTE — NUR
MS RN NOTE LEFT VOICEMAIL AT DR. GRIMALDO'S OFFICE



LEFT VOICEMAIL AT DR. GRIMALDO'S OFFICE TO INFORM OF MRI RESULT, POSITIVE FOR ACUTE INFARCT. 
AWAITING CALL BACK.

## 2018-09-14 NOTE — NUR
MS RN OPENING NOTES



PT RECEIVED IN BED AT LOWEST AND LOCKED POSITION WITH SIDE RAILS UP X2, NO S/S OF PAIN 
NOTED, BREATHING IS EVEN AND UNLABORED, WEAKNESS NOTED IN RUE AND RLE, HAS LEFT AV SHUNT 
THAT IS POSITIVE FOR BRUIT AND THRILL, RIGHT AC 20 G IS PATENT AND INTACT, SAFETY 
PRECAUTIONS IN PLACE, CALL LIGHT WITHIN REACH, WILL CONTINUE TO MONITOR AND ASSESS.

## 2018-09-14 NOTE — NUR
MS RN NOTE PAGED DR. SCALES WITH MRI RESULT



PAGED DR. SCALES TO REPORT MRI RESULT POSITIVE FOR ACUTE INFARCT. AWAITING CALL BACK.

## 2018-09-14 NOTE — NUR
MS RN OPENING NOTE



RECEIVED PT IN BED, AA0X3, ABLE TO MAKE NEEDS KNOWN. PT DENIES N/V, CHEST PAIN, SOB, AND 
PAIN AT THIS TIME. R AC #18g IV IS INFUSING NS @ 50ML/HR WITHOUT SIGNS OF REDNESS, SWELLING, 
OR PAIN. SEVERE WEAKNESS OF THE RUE AND RLE NOTED. SAFETY PRECAUTIONS MAINTAINED. BED IS 
LOCKED AND IN LOWEST POSITION, SIDE RAILS UP X2, CALL LIGHT IS WITHIN REACH. WILL CONTINUE 
TO MONITOR.

## 2018-09-14 NOTE — NUR
PRN ATIVAN:

PT MORENITA ANXIOUS, AGITATED, REQUESTING FOR HER MEDICATION, PRN ATIVAN 0.5MG IVP ADMINISTERED 
TO THE PT AT THIS TIME.

## 2018-09-14 NOTE — NUR
MS RN NOTE



MRI TEAM DID NOT INFORM NURSE OF RESULT FOR ACUTE INFARCT. DR. LOYDA ONEIL AND VOICEMAIL 
LEFT FOR DR. GRIMALDO. AWAITING CALL BACK. 

-------------------------------------------------------------------------------

Addendum: 09/14/18 at 1639 by LINDSAY TOMPKINS RN

-------------------------------------------------------------------------------

MRI OF BRAIN W/O CONTRAST RESULTED AT 1305 FOR ACUTE LACUNAR INFARCT.

## 2018-09-14 NOTE — NUR
RN NOTES:

INFORMED DR GARCIA REGARDING RESULT OF MRI BRAIN, ALSO MADE AWARE OF PT'S CONDITION ABOUT 
WEAKNESS ON RIGHT ARM AND LEG, MORE WEAKER THAN YESTERDAY. NO NEW ORDERS FROM MD

## 2018-09-15 VITALS — SYSTOLIC BLOOD PRESSURE: 170 MMHG | DIASTOLIC BLOOD PRESSURE: 99 MMHG

## 2018-09-15 VITALS — DIASTOLIC BLOOD PRESSURE: 84 MMHG | SYSTOLIC BLOOD PRESSURE: 138 MMHG

## 2018-09-15 VITALS — SYSTOLIC BLOOD PRESSURE: 155 MMHG | DIASTOLIC BLOOD PRESSURE: 88 MMHG

## 2018-09-15 VITALS — DIASTOLIC BLOOD PRESSURE: 77 MMHG | SYSTOLIC BLOOD PRESSURE: 157 MMHG

## 2018-09-15 VITALS — SYSTOLIC BLOOD PRESSURE: 161 MMHG | DIASTOLIC BLOOD PRESSURE: 93 MMHG

## 2018-09-15 VITALS — SYSTOLIC BLOOD PRESSURE: 157 MMHG | DIASTOLIC BLOOD PRESSURE: 77 MMHG

## 2018-09-15 LAB
BASOPHILS # BLD AUTO: 0.1 /CMM (ref 0–0.2)
BASOPHILS NFR BLD AUTO: 1.2 % (ref 0–2)
BUN SERPL-MCNC: 37 MG/DL (ref 7–18)
CALCIUM SERPL-MCNC: 9.3 MG/DL (ref 8.5–10.1)
CHLORIDE SERPL-SCNC: 101 MMOL/L (ref 98–107)
CO2 SERPL-SCNC: 23 MMOL/L (ref 21–32)
CREAT SERPL-MCNC: 8.4 MG/DL (ref 0.6–1.3)
EOSINOPHIL NFR BLD AUTO: 5.7 % (ref 0–6)
GLUCOSE SERPL-MCNC: 94 MG/DL (ref 74–106)
HCT VFR BLD AUTO: 29 % (ref 33–45)
HGB BLD-MCNC: 9.2 G/DL (ref 11.5–14.8)
LYMPHOCYTES NFR BLD AUTO: 0.8 /CMM (ref 0.8–4.8)
LYMPHOCYTES NFR BLD AUTO: 16.9 % (ref 20–44)
MAGNESIUM SERPL-MCNC: 2.3 MG/DL (ref 1.8–2.4)
MCH RBC QN AUTO: 30 PG (ref 26–33)
MCHC RBC AUTO-ENTMCNC: 32 G/DL (ref 31–36)
MCV RBC AUTO: 95 FL (ref 82–100)
MONOCYTES NFR BLD AUTO: 0.6 /CMM (ref 0.1–1.3)
MONOCYTES NFR BLD AUTO: 11.9 % (ref 2–12)
NEUTROPHILS # BLD AUTO: 3.2 /CMM (ref 1.8–8.9)
NEUTROPHILS NFR BLD AUTO: 64.3 % (ref 43–81)
PHOSPHATE SERPL-MCNC: 3.6 MG/DL (ref 2.5–4.9)
PLATELET # BLD AUTO: 212 /CMM (ref 150–450)
POTASSIUM SERPL-SCNC: 5.1 MMOL/L (ref 3.5–5.1)
RBC # BLD AUTO: 3.04 MIL/UL (ref 4–5.2)
RDW COEFFICIENT OF VARIATION: 15.8 (ref 11.5–15)
SODIUM SERPL-SCNC: 135 MMOL/L (ref 136–145)
WBC NRBC COR # BLD AUTO: 5 K/UL (ref 4.3–11)

## 2018-09-15 PROCEDURE — 5A1D70Z PERFORMANCE OF URINARY FILTRATION, INTERMITTENT, LESS THAN 6 HOURS PER DAY: ICD-10-PCS | Performed by: INTERNAL MEDICINE

## 2018-09-15 RX ADMIN — LABETALOL HCL SCH MG: 100 TABLET, FILM COATED ORAL at 21:23

## 2018-09-15 RX ADMIN — Medication SCH MG: at 08:56

## 2018-09-15 RX ADMIN — LORAZEPAM PRN MG: 2 INJECTION INTRAMUSCULAR; INTRAVENOUS at 06:53

## 2018-09-15 RX ADMIN — Medication SCH MG: at 12:27

## 2018-09-15 RX ADMIN — LORAZEPAM PRN MG: 2 INJECTION INTRAMUSCULAR; INTRAVENOUS at 12:28

## 2018-09-15 RX ADMIN — ASPIRIN SCH MG: 325 TABLET, FILM COATED ORAL at 08:54

## 2018-09-15 RX ADMIN — CLONIDINE HYDROCHLORIDE PRN MG: 0.1 TABLET ORAL at 18:13

## 2018-09-15 RX ADMIN — ATORVASTATIN CALCIUM SCH MG: 40 TABLET, FILM COATED ORAL at 21:20

## 2018-09-15 RX ADMIN — CLONIDINE HYDROCHLORIDE PRN MG: 0.1 TABLET ORAL at 00:37

## 2018-09-15 RX ADMIN — LOSARTAN POTASSIUM SCH MG: 50 TABLET, FILM COATED ORAL at 12:35

## 2018-09-15 RX ADMIN — Medication SCH MG: at 17:19

## 2018-09-15 RX ADMIN — SERTRALINE HYDROCHLORIDE SCH MG: 25 TABLET, FILM COATED ORAL at 17:19

## 2018-09-15 NOTE — NUR
MS RN CLOSING NOTE

\

PATIENT IN BED. SLEEPING, EASILY AROUSED WITH VERBAL STIMULI ORIENTED X3. ON ROOM AIR 
TOLERATING WELL. IN NO APPARENT DISTRESS OR DISCOMFORT AT THIS TIME. RESPIRATIONS EVEN AND 
UNLABORED. ABLE TO COMMUNICATE NEEDS. PATIENT PRESENTS WITH RIGHT SIDED WEAKNESS, RIGHT ARM 
IS FLACCID, RIGHT LEG IS REGAINING STRENGTH SLOWLY ACCORDING TO THE PATIENT. NO FACIAL 
DROOPING OBSERVED HOWEVER SPEECH IS SLIGHTLY SLURRED. PATIENT RAFI PAIN AND SOB. APPEARS 
SLIGHTLY ANXIOUS . PATIENT WITH RIGHT CHEST WALL PERMACATH, DRESSING CLEAN AND INTACT, 
UNABLE TO DIALYZE THE PATIENT TODAY DUE TO CATHETER MALFUNCTION, MD AWARE. LEFT NEW AV SHUNT 
W/STAPLES, INTACT. RIGHT AC 20 G SL, PATIENT KEPT CLEAN AND COMFORTABLE. ALL NEEDS ATTENDED. 
BED IN LOW LOCKED POSITION, SIDE RAILS UP X2, CALL LIGHT WITHIN EASY REACH, WILL ENDORSE TO 
PM NURSE FOR MERY.

## 2018-09-15 NOTE — NUR
MS RN CLOSING NOTES



PT IN BED AT LOWEST AND LOCKED POSITION WITH SIDE RAILS UP X2, NO S/S OF PAIN NOTED, 
BREATHING IS EVEN AND UNLABORED, WEAKNESS NOTED IN RUE AND RLE, HAS LEFT AV SHUNT THAT IS 
POSITIVE FOR BRUIT AND THRILL, RIGHT AC 20 G IS PATENT AND INTACT, SAFETY PRECAUTIONS IN 
PLACE, ALL NEEDS ATTENDED TO, CALL LIGHT WITHIN REACH, WILL ENDORSE TO DAY SHIFT NURSE FOR 
MERY

## 2018-09-15 NOTE — NUR
RECHECKED PATIENT'S BP MANUALLY. DECREASED /120.  WILL CONTINUE TO MONITOR AT THIS 
TIME. WILL RECHECK AGAIN AS PATIENT RELAXES SLOWLY.

## 2018-09-15 NOTE — NUR
MS RN OPENING NOTE



RECEIVED PATIENT IN BED. ALERT ORIENTED X3. ON ROOM AIR TOLERATING WELL. IN NO APPARENT 
DISTRESS OR DISCOMFORT AT THIS TIME. RESPIRATIONS EVEN AND UNLABORED. ABLE TO COMMUNICATE 
NEEDS. PATIENT PRESENTS WITH RIGHT SIDED WEAKNESS. NO FACIAL DROOPING OBSERVED HOWEVER. 
SPEECH IS CLEAR. PATIENT RAFI PAIN AND SOB. APPEARS SLIGHTLY ANXIOUS . PATIENT WITH RIGHT 
CHEST WALL PERMACATH, DRESSING CLEAN AND INTACT. LEFT NEW AV SHUNT W/STAPLES, INTACT. RIGHT 
AC 20 G WITH FLUIDS RUNNING AT 20ML/HR. PATIENT KEPT CLEAN AND COMFORTABLE. ALL NEEDS 
ATTENDED. BED IN LOW LOCKED POSITION, SIDE RAILS UP X2, CALL LIGHT WITHIN EASY REACH. WILL 
CONTINUE TO MONITOR.

## 2018-09-15 NOTE — NUR
RECHECKED PATIENT'S BP AGAIN MANUALLY. 180/100. NO SIGNIFICANT CHANGE NOTED EVEN THOUGH 
PATIENT APPEARS MORE CALM AND RELAXED. PRN CLONIDINE 0.1MG IS ADMINISTERED AT THIS TIME. 
WILL CONTINUE TO MONITOR BP.

## 2018-09-15 NOTE — NUR
MS RN OPENING NOTE



PT RECEIVED LAYING DOWN IN BED IN LOWEST AND LOCKED POSITION WITH SIDE RAILS UPX2, A/O X3, 
NO S/S OF DISTRESS OR PAIN NOTED, RIGHT AC IV 20 G IS PATENT AND INTACT, ON ROOM AIR, RIGHT 
SIDED WEAKNESS NOTED, RIGHT ARM IS FLACCID BUT RIGHT LEG IS SLOWLY REGAINING STRENGTH 
ACCORDING TO PT, SPEECH IS STILL SLIGHTLY SLURRED, HAS RIGHT CHEST WALL PERMA CATH, LEFT ARM 
AV SHUNT NOTED AND PALPATED, SAFETY PRECAUTIONS IN PLACE, CALL LIGHT WITHIN REACH, WILL 
CONTINUE TO MONITOR AND ASSESS.

## 2018-09-15 NOTE — NUR
PRN ATIVAN:

P[T AGITATED AND ANXIOUS, PRN ATIVAN 0.25MG IVP ADMINISTERED AT THIS TIME

-------------------------------------------------------------------------------

Addendum: 09/15/18 at 0659 by ABIOLA VIERA RN

-------------------------------------------------------------------------------

correction of entry:

PRN ATIVAN:

PT AGITATED AND ANXIOUS, PRN ATIVAN 0.25ML (0.5MG) IVP ADMINISTERED AT THIS TIME

## 2018-09-15 NOTE — NUR
RECEIVED REPORT FROM DIALYSIS NURSE THAT PATIENT'S BP IS ELEVATED /125. PATIENT IS 
DEPRESSED, ANXIOUS AND IS CRYING AT TIMES. PATIENT'S SCHEDULED ZOLOFT WAS ADMINISTERED TO 
HELP WITH ANXIETY AND DEPRESSED MOOD. WILL MONITOR BP MANUALLY.

## 2018-09-15 NOTE — NUR
RN NOTES:

PT NOTED TO HAVE SLURRED SPEECH HOWEVER ABLE TO COMMUNICATE NEEDS. PT IS A/O X4, DEPRESSED 
AND VERY WORRIED ABOUT HER DOG THAT WAS LOST SINCE YESTERDAY. PT SEEMS TO BE EMOTIONAL WITH 
THIS EVENT AND HER NEW DIAGNOSIS/CONDITION. PT SUPPORT SYSTEM IS HER SISTER/FAMILY. 
REASSURANCE PROVIDED TO PT, ENCOURAGED VERBALIZATION OF FEELINGS. NO FACIAL DROOPING NOTED, 
PT ABLE TO MOVE BLE, HOWEVER WITH LIMITED STRENGTH, RIGHT ARM NOTED TO BE FLACCID, STATED 
ITS STILL WEAK AND SHE DOESNT HAVE ANY SENSATION ON THE RIGHT ARM, PT USES GLASSES FOR DAILY 
ACTIVITIES, HIGH FALL RISK, BED ALARM SECURED, STROKE PROTOCOL FOLLOWED, WILL CONTINUE 
MONITORING PT.

## 2018-09-16 VITALS — DIASTOLIC BLOOD PRESSURE: 97 MMHG | SYSTOLIC BLOOD PRESSURE: 169 MMHG

## 2018-09-16 VITALS — DIASTOLIC BLOOD PRESSURE: 92 MMHG | SYSTOLIC BLOOD PRESSURE: 153 MMHG

## 2018-09-16 VITALS — SYSTOLIC BLOOD PRESSURE: 160 MMHG | DIASTOLIC BLOOD PRESSURE: 93 MMHG

## 2018-09-16 LAB
BUN SERPL-MCNC: 53 MG/DL (ref 7–18)
CALCIUM SERPL-MCNC: 9.5 MG/DL (ref 8.5–10.1)
CHLORIDE SERPL-SCNC: 98 MMOL/L (ref 98–107)
CO2 SERPL-SCNC: 22 MMOL/L (ref 21–32)
CREAT SERPL-MCNC: 10.6 MG/DL (ref 0.6–1.3)
GLUCOSE SERPL-MCNC: 121 MG/DL (ref 74–106)
POTASSIUM SERPL-SCNC: 6.1 MMOL/L (ref 3.5–5.1)
SODIUM SERPL-SCNC: 135 MMOL/L (ref 136–145)

## 2018-09-16 RX ADMIN — SERTRALINE HYDROCHLORIDE SCH MG: 25 TABLET, FILM COATED ORAL at 16:44

## 2018-09-16 RX ADMIN — ATORVASTATIN CALCIUM SCH MG: 40 TABLET, FILM COATED ORAL at 21:26

## 2018-09-16 RX ADMIN — Medication SCH MG: at 13:05

## 2018-09-16 RX ADMIN — ASPIRIN SCH MG: 325 TABLET, FILM COATED ORAL at 08:20

## 2018-09-16 RX ADMIN — Medication SCH MG: at 16:43

## 2018-09-16 RX ADMIN — LOSARTAN POTASSIUM SCH MG: 50 TABLET, FILM COATED ORAL at 08:23

## 2018-09-16 RX ADMIN — SERTRALINE HYDROCHLORIDE SCH MG: 25 TABLET, FILM COATED ORAL at 16:47

## 2018-09-16 RX ADMIN — LABETALOL HCL SCH MG: 100 TABLET, FILM COATED ORAL at 08:21

## 2018-09-16 RX ADMIN — CLONIDINE HYDROCHLORIDE PRN MG: 0.1 TABLET ORAL at 16:44

## 2018-09-16 RX ADMIN — LABETALOL HCL SCH MG: 100 TABLET, FILM COATED ORAL at 21:26

## 2018-09-16 RX ADMIN — Medication SCH MG: at 08:20

## 2018-09-16 NOTE — NUR
MS RN CLOSING NOTE



PATIENT IN BED. SLEEPING, EASILY AROUSED WITH VERBAL STIMULI ORIENTED X3. ON ROOM AIR 
TOLERATING WELL. IN NO APPARENT DISTRESS OR DISCOMFORT AT THIS TIME. RESPIRATIONS EVEN AND 
UNLABORED. ABLE TO COMMUNICATE NEEDS. RIGHT SIDED WEAKNESS, RIGHT ARM IS FLACCID, RIGHT LEG 
SEVERE WEAKNESS, IMPROVING. NO FACIAL DROOPING OBSERVED HOWEVER SPEECH IS SLIGHTLY SLURRED. 
PATIENT RAFI PAIN AND SOB. PATIENT WITH RIGHT CHEST WALL PERMACATH, DRESSING CLEAN AND 
INTACT, UNABLE TO DIALYZE THE PATIENT TODAY DUE TO CATHETER MALFUNCTION, MD AWARE. LEFT NEW 
AV SHUNT W/STAPLES, INTACT. RIGHT AC 20 G SL, PATIENT KEPT CLEAN AND COMFORTABLE. ALL NEEDS 
ATTENDED. BED IN LOW LOCKED POSITION, SIDE RAILS UP X2, CALL LIGHT WITHIN EASY REACH, WILL 
ENDORSE TO PM NURSE FOR MERY.

## 2018-09-16 NOTE — NUR
MS RN NOTE:



RECEIVED CALL FROM CRITICAL [facility], CALLED [facility], SPOKE TO DR. BERNAL, INFORM ON 
POTASSIUM LEVEL OF 6.1, BUN 53, AND CREATININE 10.6. RECEIVED ORDERS FOR KAYEXALATE 30 GRAM 
ORAL ONCE. PATIENT TO POSSIBLY HAVE HD IN MORNING. WILL CONTINUE TO MONITOR.

## 2018-09-16 NOTE — NUR
MS RN OPENING NOTE



RECEIVED PATIENT IN BED. ALERT ORIENTED X3. ON ROOM AIR TOLERATING WELL. IN NO APPARENT 
DISTRESS OR DISCOMFORT AT THIS TIME. RESPIRATIONS EVEN AND UNLABORED. ABLE TO COMMUNICATE 
NEEDS. PATIENT PRESENTS WITH RIGHT SIDED WEAKNESS, RIGHT ARM IS FLACCID, RIGHT LEG IS 
REGAINING STRENGTH SLOWLY ACCORDING TO THE PATIENT. NO FACIAL DROOPING OBSERVED HOWEVER 
SPEECH IS SLIGHTLY SLURRED. PATIENT RAFI PAIN AND SOB. APPEARS SLIGHTLY ANXIOUS. PATIENT 
WITH RIGHT CHEST WALL PERMACATH, DRESSING CLEAN AND INTACT, LEFT ARM NEW AV SHUNT W/STAPLES, 
INTACT, NO SIGN OF INFECTION. RIGHT AC 20 G SL, PATIENT KEPT CLEAN AND COMFORTABLE. ALL 
NEEDS ATTENDED. BED IN LOW LOCKED POSITION, SIDE RAILS UP X2, CALL LIGHT WITHIN EASY REACH, 
WILL CONTINUE TO MONITOR.

## 2018-09-16 NOTE — NUR
RECEIVED CALL BACK FROM DR. BERNAL. ORDER RECEIVED FOR HYDRALAZINE 25MG PO Q6HRS PRN FOR 
SBP>170.  AND ZOFRAN 4MG IV Q4HRS AS NEEDED FOR NAUSEA/VOMITING. ORDER READ BACK AND 
VERIFIED. WILL CARRY OUT AND CONTINUE TO MONITOR.

## 2018-09-16 NOTE — NUR
PATIENT HAD AN EPISODE OF PROJECTILE VOMITING. FEELS BETTER AT THIS TIME. STILL A LITTLE 
NAUSEOUS. PATIENT'S BP IS ELEVATED /100. PAGED ON CALL DR. BERNAL FOR NEW ORDERS.

## 2018-09-16 NOTE — NUR
MS RN NOTE:



PATIENT RESTING IN BED, NO ACUTE DISTRESS NOTED. BREATHING EVEN AND UNLABORED, NO SOB NOTED. 
IV TO RAC IN PLACE. BED LOCKED AND IN LOWEST POSITION, CALL LIGHT IN REACH, WILL CONTINUE TO 
MONITOR.

## 2018-09-16 NOTE — NUR
MS RN OPENING NOTE



PT LAYING DOWN IN BED SLEEPING IN LOWEST AND LOCKED POSITION WITH SIDE RAILS UPX2, A/O X3, 
NO S/S OF DISTRESS OR PAIN NOTED, RIGHT AC IV 20 G IS PATENT AND INTACT, ON ROOM AIR, RIGHT 
SIDED WEAKNESS NOTED, RIGHT ARM IS FLACCID BUT RIGHT LEG IS SLOWLY REGAINING STRENGTH 
ACCORDING TO PT, SPEECH IS STILL SLIGHTLY SLURRED, HD WAS NOT SUCCESFUL 9/15 VIA RIGHT CHEST 
WALL PERMA CATH THAT DR. SCALES WILL LOOK AT LATER TODAY, LEFT ARM AV SHUNT NOTED AND 
PALPATED, SAFETY PRECAUTIONS IN PLACE, ALL NEEDS ATTENDED, CALL LIGHT WITHIN REACH, WILL 
ENDORSE TO DAY SHIFT FOR CONTINUITY OF CARE.

## 2018-09-16 NOTE — NUR
RECEIVED ORDER FROM DR. BERNAL FOR STAT BNP. READ BACK AND VERIFIED. WILL CARRY OUT AND 
CONTINUE TO MONITOR.

-------------------------------------------------------------------------------

Addendum: 09/16/18 at 1917 by SAURABH MOTLEY RN

-------------------------------------------------------------------------------

RECEIVED ORDER FOR STAT (BMP) BASIC METABOLIC PANEL. READ BACK AND VERIFIED.

## 2018-09-17 VITALS — SYSTOLIC BLOOD PRESSURE: 172 MMHG | DIASTOLIC BLOOD PRESSURE: 106 MMHG

## 2018-09-17 VITALS — DIASTOLIC BLOOD PRESSURE: 106 MMHG | SYSTOLIC BLOOD PRESSURE: 186 MMHG

## 2018-09-17 VITALS — SYSTOLIC BLOOD PRESSURE: 179 MMHG | DIASTOLIC BLOOD PRESSURE: 92 MMHG

## 2018-09-17 LAB
BASOPHILS # BLD AUTO: 0 /CMM (ref 0–0.2)
BASOPHILS NFR BLD AUTO: 0.6 % (ref 0–2)
BUN SERPL-MCNC: 50 MG/DL (ref 7–18)
CALCIUM SERPL-MCNC: 9.1 MG/DL (ref 8.5–10.1)
CHLORIDE SERPL-SCNC: 99 MMOL/L (ref 98–107)
CO2 SERPL-SCNC: 24 MMOL/L (ref 21–32)
CREAT SERPL-MCNC: 11.1 MG/DL (ref 0.6–1.3)
EOSINOPHIL NFR BLD AUTO: 4.1 % (ref 0–6)
GLUCOSE SERPL-MCNC: 100 MG/DL (ref 74–106)
HCT VFR BLD AUTO: 29 % (ref 33–45)
HGB BLD-MCNC: 9.5 G/DL (ref 11.5–14.8)
LYMPHOCYTES NFR BLD AUTO: 0.6 /CMM (ref 0.8–4.8)
LYMPHOCYTES NFR BLD AUTO: 15 % (ref 20–44)
MAGNESIUM SERPL-MCNC: 2.3 MG/DL (ref 1.8–2.4)
MCH RBC QN AUTO: 31 PG (ref 26–33)
MCHC RBC AUTO-ENTMCNC: 33 G/DL (ref 31–36)
MCV RBC AUTO: 92 FL (ref 82–100)
MONOCYTES NFR BLD AUTO: 0.5 /CMM (ref 0.1–1.3)
MONOCYTES NFR BLD AUTO: 13 % (ref 2–12)
NEUTROPHILS # BLD AUTO: 2.4 /CMM (ref 1.8–8.9)
NEUTROPHILS NFR BLD AUTO: 67.3 % (ref 43–81)
PHOSPHATE SERPL-MCNC: 3.7 MG/DL (ref 2.5–4.9)
PLATELET # BLD AUTO: 221 /CMM (ref 150–450)
POTASSIUM SERPL-SCNC: 4.9 MMOL/L (ref 3.5–5.1)
RBC # BLD AUTO: 3.1 MIL/UL (ref 4–5.2)
RDW COEFFICIENT OF VARIATION: 15.1 (ref 11.5–15)
SODIUM SERPL-SCNC: 138 MMOL/L (ref 136–145)
WBC NRBC COR # BLD AUTO: 3.7 K/UL (ref 4.3–11)

## 2018-09-17 PROCEDURE — 5A1D70Z PERFORMANCE OF URINARY FILTRATION, INTERMITTENT, LESS THAN 6 HOURS PER DAY: ICD-10-PCS | Performed by: INTERNAL MEDICINE

## 2018-09-17 PROCEDURE — 0J2SXYZ CHANGE OTHER DEVICE IN HEAD AND NECK SUBCUTANEOUS TISSUE AND FASCIA, EXTERNAL APPROACH: ICD-10-PCS | Performed by: THORACIC SURGERY (CARDIOTHORACIC VASCULAR SURGERY)

## 2018-09-17 RX ADMIN — LABETALOL HCL SCH MG: 100 TABLET, FILM COATED ORAL at 08:39

## 2018-09-17 RX ADMIN — Medication SCH MG: at 17:30

## 2018-09-17 RX ADMIN — LORAZEPAM PRN MG: 2 INJECTION INTRAMUSCULAR; INTRAVENOUS at 00:53

## 2018-09-17 RX ADMIN — LABETALOL HCL SCH MG: 100 TABLET, FILM COATED ORAL at 20:26

## 2018-09-17 RX ADMIN — ATORVASTATIN CALCIUM SCH MG: 40 TABLET, FILM COATED ORAL at 22:00

## 2018-09-17 RX ADMIN — Medication SCH MG: at 08:42

## 2018-09-17 RX ADMIN — Medication SCH MG: at 13:00

## 2018-09-17 RX ADMIN — ASPIRIN SCH MG: 325 TABLET, FILM COATED ORAL at 08:42

## 2018-09-17 RX ADMIN — SERTRALINE HYDROCHLORIDE SCH MG: 25 TABLET, FILM COATED ORAL at 17:00

## 2018-09-17 RX ADMIN — LORAZEPAM PRN MG: 2 INJECTION INTRAMUSCULAR; INTRAVENOUS at 17:32

## 2018-09-17 RX ADMIN — LORAZEPAM PRN MG: 2 INJECTION INTRAMUSCULAR; INTRAVENOUS at 10:02

## 2018-09-17 RX ADMIN — LOSARTAN POTASSIUM SCH MG: 50 TABLET, FILM COATED ORAL at 08:39

## 2018-09-17 NOTE — NUR
RECHECKED PATIENT'S BP AFTER ADMINISTERING MORNING BP MEDICATIONS. BP IS /101 AND HR 
IS 95. PATIENT IS ANXIOUS AND DEPRESSED. ASKED FOR ATIVAN. ADMINISTERED ATIVAN 0.5MG PER 
DR'S ORDER. WILL DISCUSS WITH DR. SCALES AND CONTINUE TO MONITOR.

## 2018-09-17 NOTE — NUR
MS RN NOTE:



RECEIVED CALL FROM DR. PEGUERO TO PUT PATIENT NPO FOR PERMACATHETER PLACEMENT AND TO OBTAIN 
CONSENT FOR PROCEDURE. ORDER NOTED AND CARRIED OUT. WILL ENDORSE TO DAY NURSE.

## 2018-09-17 NOTE — NUR
PATIENT CAME BACK FROM OR. STABLE. VITAL SIGNS STABLE. BP IS STILL ELEVATED. HD WILL BE 
PERFORMED SOON PER MD ORDER. WILL ADMINISTER BP MEDICATIONS MEANWHILE AND CONTINUE TO 
MONITOR.

## 2018-09-17 NOTE — NUR
MS RN OPENING NOTE



RECEIVED PATIENT IN BED. ALERT ORIENTED X3. ON ROOM AIR TOLERATING WELL. IN NO APPARENT 
DISTRESS OR DISCOMFORT AT THIS TIME. RESPIRATIONS EVEN AND UNLABORED. ABLE TO COMMUNICATE 
NEEDS. PATIENT PRESENTS WITH RIGHT SIDED WEAKNESS, RIGHT ARM IS FLACCID, RIGHT LEG IS 
REGAINING STRENGTH SLOWLY ACCORDING TO THE PATIENT. NO FACIAL DROOPING OBSERVED, SPEECH IS 
SLIGHTLY SLURRED. PATIENT RAFI PAIN AND SOB. APPEARS SLIGHTLY ANXIOUS. PATIENT WITH RIGHT 
CHEST WALL PERMACATH, DRESSING CLEAN AND INTACT, LEFT ARM NEW AV SHUNT W/STAPLES, INTACT, NO 
SIGN OF INFECTION. RIGHT AC 20 G SL PATENT AND INTACT. PATIENT IS NPO FOR UPCOMING 
PROCEDURE. PATIENT KEPT CLEAN AND COMFORTABLE. ALL NEEDS ATTENDED. BED IN LOW LOCKED 
POSITION, SIDE RAILS UP X2, CALL LIGHT WITHIN EASY REACH, WILL CONTINUE TO MONITOR.

## 2018-09-17 NOTE — NUR
PATIENT PRESENTS WITH ELEVATED BP /106 AND HR . GIVEN PATIENT'S RECENT HISTORY 
OF STROKE, MORNING BP MEDICATIONS WERE GIVEN WITH  SMALL SIP OF WATER.  WILL CONTINUE TO 
MONITOR.

## 2018-09-17 NOTE — NUR
RN NOTES/ BP



PT BLOOD PRESSURE /106, , ASYMPTOMATIC, NO SIGNS OF SOB, NO DISTRESS. GIVEN 
TRANDATE 100MG, WILL CONTINUE TO MONITOR

## 2018-09-17 NOTE — NUR
MS RN CLOSING NOTE



PATIENT IN BED. ALERT ORIENTED X3. ON ROOM AIR TOLERATING WELL. IN NO APPARENT DISTRESS OR 
DISCOMFORT AT THIS TIME. RESPIRATIONS EVEN AND UNLABORED. ABLE TO COMMUNICATE NEEDS. PATIENT 
STILL HAS RIGHT SIDED WEAKNESS, RIGHT ARM IS FLACCID, RIGHT LEG IS REGAINING STRENGTH SLOWLY 
ACCORDING TO THE PATIENT. NO FACIAL DROOPING OBSERVED, SPEECH IS SLIGHTLY SLURRED. PATIENT 
RAFI PAIN AND SOB. APPEARS SLIGHTLY ANXIOUS. PATIENT WITH RIGHT CHEST WALL PERMACATH, WAS 
REPLACED TODAY, DRESSING CLEAN AND INTACT, LEFT ARM NEW AV SHUNT W/STAPLES, INTACT, NO SIGN 
OF INFECTION. RECEIVING HEMODIALYSIS AT THIS TIME. RIGHT AC 20 G SL PATENT AND INTACT. 
PATIENT KEPT CLEAN AND COMFORTABLE. ALL NEEDS ATTENDED. BED IN LOW LOCKED POSITION, SIDE 
RAILS UP X2, CALL LIGHT WITHIN EASY REACH, WILL ENDORSE TO PM NURSE FOR MERY.

## 2018-09-17 NOTE — NUR
MS RN  NOTE:



PATIENT ANXIOUS AND REQUESTING FOR ATIVAN. ATIVAN 0.5MG IV GIVEN PER MD ORDER. WILL CONTINUE 
TO MONITOR.

## 2018-09-17 NOTE — NUR
MS RN NOTE:



PATIENT RESTING IN BED, NO ACUTE DISTRESS NOTED. BREATHING EVEN AND UNLABORED, NO SOB NOTED. 
IV TO RAC IN PLACE. BED LOCKED AND IN LOWEST POSITION, CALL LIGHT IN REACH, WILL ENDORSE TO 
DAY NURSE TO CONTINUE WITH PLAN OF CARE.

## 2018-09-17 NOTE — NUR
RN OPENING NOTES



PT ASLEEP IN BED COMFORTABLY. PT O2 VIA NASAL CANNULA AT 3L, NO SIGNS OF LABORED BREATHING. 
PT CURRENTLY RECEIVING DIALYSIS, NO SIGNS OF DISTRESS. AV SHUNT ON LEFT FOREARM IS INTACT 
WITH STAPLES, NO SIGNS OF BLEEDING. IV ACCESS ON THE RIGHT AC 20G PATENT AND INTACT. PT DOES 
NOT SHOW ANY SIGNS OF PAIN. SAFETY MEASURES IN PLACED, CALL LIGHT WITHIN REACH. WILL 
CONTINUE TO MONITOR AND ASSESS.

## 2018-09-17 NOTE — NUR
RECEIVED VERBAL ORDER FROM DR DELGADO TO GIVE HYDRALAZYN 50MG WITH SIP OF WATER DESPITE 
PATIENT'S NPO STATUS, AS PATIENT'S BP IS STILL ELEVATED /95 WITH HR OF 91. WILL CARRY 
OUT AND CONTINUE TO MONITOR.

## 2018-09-17 NOTE — NUR
MS RN NOTE:



PATIENT BLOOD PRESSURE ELEVATED /96, HR 99, HYDRALAZINE 25MG ORAL GIVEN PER MD ORDER. 
WILL CONTINUE TO MONITOR.

## 2018-09-17 NOTE — NUR
RECHECKED PATIENT'S BLOOD PRESSURE. NO SIGNIFICANT CHANGES NOTED. 166/97. WILL CONTINUE TO 
MONITOR. MD AWARE.

## 2018-09-18 VITALS — SYSTOLIC BLOOD PRESSURE: 164 MMHG | DIASTOLIC BLOOD PRESSURE: 84 MMHG

## 2018-09-18 VITALS — SYSTOLIC BLOOD PRESSURE: 145 MMHG | DIASTOLIC BLOOD PRESSURE: 79 MMHG

## 2018-09-18 VITALS — DIASTOLIC BLOOD PRESSURE: 89 MMHG | SYSTOLIC BLOOD PRESSURE: 131 MMHG

## 2018-09-18 VITALS — DIASTOLIC BLOOD PRESSURE: 97 MMHG | SYSTOLIC BLOOD PRESSURE: 174 MMHG

## 2018-09-18 VITALS — SYSTOLIC BLOOD PRESSURE: 173 MMHG | DIASTOLIC BLOOD PRESSURE: 101 MMHG

## 2018-09-18 PROCEDURE — 5A1D70Z PERFORMANCE OF URINARY FILTRATION, INTERMITTENT, LESS THAN 6 HOURS PER DAY: ICD-10-PCS | Performed by: INTERNAL MEDICINE

## 2018-09-18 RX ADMIN — LORAZEPAM PRN MG: 2 INJECTION INTRAMUSCULAR; INTRAVENOUS at 04:41

## 2018-09-18 RX ADMIN — Medication SCH MG: at 14:50

## 2018-09-18 RX ADMIN — ATORVASTATIN CALCIUM SCH MG: 40 TABLET, FILM COATED ORAL at 21:24

## 2018-09-18 RX ADMIN — Medication SCH MG: at 13:00

## 2018-09-18 RX ADMIN — LORAZEPAM PRN MG: 2 INJECTION INTRAMUSCULAR; INTRAVENOUS at 21:24

## 2018-09-18 RX ADMIN — SERTRALINE HYDROCHLORIDE SCH MG: 25 TABLET, FILM COATED ORAL at 17:53

## 2018-09-18 RX ADMIN — ASPIRIN SCH MG: 325 TABLET, FILM COATED ORAL at 09:03

## 2018-09-18 RX ADMIN — SERTRALINE HYDROCHLORIDE SCH MG: 25 TABLET, FILM COATED ORAL at 17:00

## 2018-09-18 RX ADMIN — CLONIDINE HYDROCHLORIDE PRN MG: 0.1 TABLET ORAL at 03:18

## 2018-09-18 RX ADMIN — Medication SCH MG: at 17:02

## 2018-09-18 RX ADMIN — Medication SCH MG: at 09:03

## 2018-09-18 RX ADMIN — LABETALOL HCL SCH MG: 100 TABLET, FILM COATED ORAL at 09:03

## 2018-09-18 RX ADMIN — LOSARTAN POTASSIUM SCH MG: 50 TABLET, FILM COATED ORAL at 09:04

## 2018-09-18 RX ADMIN — LABETALOL HCL SCH MG: 100 TABLET, FILM COATED ORAL at 20:46

## 2018-09-18 NOTE — NUR
MS RN OPENING NOTE



RECEIVED PATIENT IN BED. SLEEPING, EASILY AROUSED WITH VERBAL STIMULI. ORIENTED X3. SPEECH 
IS SLURRED ON ROOM AIR TOLERATING WELL. IN NO APPARENT DISTRESS OR DISCOMFORT AT THIS TIME. 
RESPIRATIONS EVEN AND UNLABORED. ABLE TO COMMUNICATE NEEDS. RIGHT SIDED SEVERE WEAKNESS. 
RIGHT ARM FLACCID. NO FACIAL DROOPING OBSERVED. PATIENT RAFI PAIN AND SOB. PATIENT HAS 
DIFFICULTY COPING WITH HEALTH CONDITION. EXPRESSES SADNESS. PATIENT WITH RIGHT CHEST WALL 
NEW PERMACATH, DRESSING INTACT, LEFT ARM NEW AV SHUNT W/STAPLES, INTACT, NO SIGN OF 
INFECTION. RIGHT AC 20 G SL PATENT AND INTACT. PATIENT KEPT CLEAN AND COMFORTABLE. ALL NEEDS 
ATTENDED. BED IN LOW LOCKED POSITION, SIDE RAILS UP X2, CALL LIGHT WITHIN EASY REACH, WILL 
CONTINUE TO MONITOR.

## 2018-09-18 NOTE — NUR
MS RN CLOSING NOTE



PATIENT IN BED. SLEEPING, EASILY AROUSED WITH VERBAL STIMULI, ORIENTED X3. ON ROOM AIR 
TOLERATING WELL. IN NO APPARENT DISTRESS OR DISCOMFORT AT THIS TIME. RESPIRATIONS EVEN AND 
UNLABORED. ABLE TO COMMUNICATE NEEDS. PATIENT STILL HAS RIGHT SIDED WEAKNESS, RIGHT ARM IS 
FLACCID, RIGHT LEG IS REGAINING STRENGTH SLOWLY ACCORDING TO THE PATIENT. NO FACIAL DROOPING 
OBSERVED, SPEECH IS SLIGHTLY SLURRED. PATIENT RAFI PAIN AND SOB. PATIENT WITH RIGHT CHEST 
WALL PERMACATH, DRESSING CLEAN AND INTACT, LEFT ARM NEW AV SHUNT W/STAPLES, INTACT, NO SIGN 
OF INFECTION. RECEIVED HEMODIALYSIS TODAY. RIGHT AC 20 G SL PATENT AND INTACT. PATIENT KEPT 
CLEAN AND COMFORTABLE. ALL NEEDS ATTENDED. BED IN LOW LOCKED POSITION, SIDE RAILS UP X2, 
CALL LIGHT WITHIN EASY REACH, WILL ENDORSE TO PM NURSE FOR MERY.

## 2018-09-18 NOTE — NUR
MS RN OPENING NOTES



RECEIVED PATIENT IN BED, RESTING. ALERT & ORIENTED X3. ON ROOM AIR TOLERATING WELL. IN NO 
APPARENT DISTRESS OR DISCOMFORT AT THIS TIME. RESPIRATIONS EVEN AND UNLABORED. ABLE TO 
COMMUNICATE NEEDS. RIGHT LEG SEVERE WEAKNESS & RIGHT ARM FLACCID. NO FACIAL DROOPING 
OBSERVED. PATIENT RAFI PAIN AND SOB. PATIENT HAS DIFFICULTY COPING WITH HEALTH CONDITION. 
EXPRESSES SADNESS. NOTED TO BE EMOTIONAL @ THIS TIME, CRYING REMEMBERING HER LOST DOG, 
EMOTIONAL SUPPORT PROVIDED. PATIENT WITH RIGHT CHEST WALL NEW PERMA CATH, DRESSING INTACT, 
LEFT ARM NEW AV SHUNT W/STAPLES, INTACT, NO SIGN OF INFECTION. PT WAS DIALYZED TODAY, PER AM 
RN REPORT WITH 2 LITER OUTPUT. RIGHT AC 20 G SL PATENT AND INTACT. PATIENT KEPT CLEAN AND 
COMFORTABLE. ALL NEEDS ATTENDED. BED IN LOW LOCKED POSITION, SIDE RAILS UP X2, CALL LIGHT 
WITHIN EASY REACH, WILL CONTINUE TO MONITOR CLOSELY.

## 2018-09-18 NOTE — NUR
RN CLOSING NOTES



PT ASLEEP IN BED COMFORTABLY. PT IN ROOM AIR, NO SIGNS OF LABORED BREATHING. AV SHUNT ON 
LEFT FOREARM IS INTACT WITH STAPLES, LEFT IN OPEN TO AIR, NO SIGNS OF BLEEDING. IV ACCESS ON 
THE RIGHT AC 20G PATENT AND INTACT. RIGHT UPPER WALL PERMACATH INTACT, DRESSING INTACT, NO 
SIGNS OF BLEEDING OR INFECTION. SHOWS NO SIGN OF PAIN. SAFETY MEASURES IN PLACED, CALL LIGHT 
WITHIN REACH. WILL ENDORSE CONTINUITY OF CARE TO THE ONCOMING NURSE.

## 2018-09-18 NOTE — NUR
RN NOTES/ BP READING



PT BP /98, HR 79. ASYMPTOMATIC, NO SIGNS OF DISTRESS. PT OFFERED CATAPRES PRN, PT 
AGREED TO TAKE MEDICATION.

## 2018-09-18 NOTE — NUR
PATIENT IS CURRENTLY RECEIVING HEMODIALYSIS. BP MEDICATION WAS HELD AT THIS TIME. REFUSED 
PHOSLO AT THIS TIME. WILL CONTINUE TO MONITOR.

## 2018-09-18 NOTE — NUR
PATIENT CHANGED HER MIND WANTS TO TAKE THE CALCIUM WITH FOOD NOW. ADMINISTER AND CONTINUE TO 
MONITOR.

## 2018-09-18 NOTE — NUR
PRN ATIVAN GIVEN



PT VERBALIZED THAT SHE IS ANXIOUS & NEEDS TO GET HER ATIVAN, PRN ATIVAN GIVEN AS ORDERED, 
INSTRUCTED THE PT TO CALL FOR HELP & NOT TO GET OUT OF BED BY HERSELF DUE TO RIGHT SIDED 
WEAKNESS & ATIVAN WAS GIVEN, VERBALIZED UNDERSTANDING. WILL REASSESS FOR EFFECTIVENESS.

## 2018-09-19 ENCOUNTER — HOSPITAL ENCOUNTER (INPATIENT)
Dept: HOSPITAL 91 - VRC | Age: 53
LOS: 15 days | Discharge: HOME HEALTH | DRG: 56 | End: 2018-10-04
Payer: MEDICARE

## 2018-09-19 ENCOUNTER — HOSPITAL ENCOUNTER (INPATIENT)
Age: 53
LOS: 15 days | Discharge: HOME HEALTH | DRG: 56 | End: 2018-10-04

## 2018-09-19 VITALS — DIASTOLIC BLOOD PRESSURE: 80 MMHG | SYSTOLIC BLOOD PRESSURE: 139 MMHG

## 2018-09-19 VITALS — SYSTOLIC BLOOD PRESSURE: 145 MMHG | DIASTOLIC BLOOD PRESSURE: 79 MMHG

## 2018-09-19 DIAGNOSIS — Z99.2: ICD-10-CM

## 2018-09-19 DIAGNOSIS — D64.9: ICD-10-CM

## 2018-09-19 DIAGNOSIS — E87.1: ICD-10-CM

## 2018-09-19 DIAGNOSIS — F06.31: ICD-10-CM

## 2018-09-19 DIAGNOSIS — I12.0: ICD-10-CM

## 2018-09-19 DIAGNOSIS — E87.6: ICD-10-CM

## 2018-09-19 DIAGNOSIS — N18.6: ICD-10-CM

## 2018-09-19 DIAGNOSIS — Q61.3: ICD-10-CM

## 2018-09-19 DIAGNOSIS — I69.351: Primary | ICD-10-CM

## 2018-09-19 LAB
ADD UMIC: YES
BUN SERPL-MCNC: 29 MG/DL (ref 7–18)
CALCIUM SERPL-MCNC: 9 MG/DL (ref 8.5–10.1)
CHLORIDE SERPL-SCNC: 101 MMOL/L (ref 98–107)
CO2 SERPL-SCNC: 29 MMOL/L (ref 21–32)
CREAT SERPL-MCNC: 7.1 MG/DL (ref 0.6–1.3)
EOSINOPHIL NFR BLD MANUAL: 6 % (ref 0–4)
GLUCOSE SERPL-MCNC: 97 MG/DL (ref 74–106)
HCT VFR BLD AUTO: 29 % (ref 33–45)
HGB BLD-MCNC: 9.6 G/DL (ref 11.5–14.8)
LYMPHOCYTES NFR BLD MANUAL: 13 % (ref 16–48)
MAGNESIUM SERPL-MCNC: 2.2 MG/DL (ref 1.8–2.4)
MCH RBC QN AUTO: 31 PG (ref 26–33)
MCHC RBC AUTO-ENTMCNC: 33 G/DL (ref 31–36)
MCV RBC AUTO: 93 FL (ref 82–100)
MONOCYTES NFR BLD MANUAL: 11 % (ref 0–11)
NEUTS SEG NFR BLD MANUAL: 70 % (ref 42–76)
PHOSPHATE SERPL-MCNC: 4.5 MG/DL (ref 2.5–4.9)
PLATELET # BLD AUTO: 227 /CMM (ref 150–450)
POTASSIUM SERPL-SCNC: 4.5 MMOL/L (ref 3.5–5.1)
RBC # BLD AUTO: 3.11 MIL/UL (ref 4–5.2)
RDW COEFFICIENT OF VARIATION: 15.3 (ref 11.5–15)
SODIUM SERPL-SCNC: 138 MMOL/L (ref 136–145)
UR ASCORBIC ACID: NEGATIVE MG/DL
UR BACTERIA: (no result) /HPF
UR BILIRUBIN (DIP): NEGATIVE MG/DL
UR BLOOD (DIP): (no result) MG/DL
UR CLARITY: CLEAR
UR COLOR: (no result)
UR GLUCOSE (DIP): (no result) MG/DL
UR KETONES (DIP): NEGATIVE MG/DL
UR LEUKOCYTE ESTERASE (DIP): NEGATIVE LEU/UL
UR NITRITE (DIP): NEGATIVE MG/DL
UR PH (DIP): 8 (ref 5–9)
UR RBC: 1 /HPF (ref 0–5)
UR SPECIFIC GRAVITY (DIP): 1 (ref 1–1.03)
UR SQUAMOUS EPITHELIAL CELL: (no result) /HPF
UR TOTAL PROTEIN (DIP): (no result) MG/DL
UR UROBILINOGEN (DIP): NEGATIVE MG/DL
UR WBC: 4 /HPF (ref 0–5)
WBC NRBC COR # BLD AUTO: 3.8 K/UL (ref 4.3–11)

## 2018-09-19 PROCEDURE — 86706 HEP B SURFACE ANTIBODY: CPT

## 2018-09-19 PROCEDURE — 87081 CULTURE SCREEN ONLY: CPT

## 2018-09-19 PROCEDURE — 97167 OT EVAL HIGH COMPLEX 60 MIN: CPT

## 2018-09-19 PROCEDURE — 80053 COMPREHEN METABOLIC PANEL: CPT

## 2018-09-19 PROCEDURE — 97535 SELF CARE MNGMENT TRAINING: CPT

## 2018-09-19 PROCEDURE — 83735 ASSAY OF MAGNESIUM: CPT

## 2018-09-19 PROCEDURE — 92523 SPEECH SOUND LANG COMPREHEN: CPT

## 2018-09-19 PROCEDURE — 87340 HEPATITIS B SURFACE AG IA: CPT

## 2018-09-19 PROCEDURE — 81001 URINALYSIS AUTO W/SCOPE: CPT

## 2018-09-19 PROCEDURE — 97150 GROUP THERAPEUTIC PROCEDURES: CPT

## 2018-09-19 PROCEDURE — 80048 BASIC METABOLIC PNL TOTAL CA: CPT

## 2018-09-19 PROCEDURE — 87086 URINE CULTURE/COLONY COUNT: CPT

## 2018-09-19 PROCEDURE — 92507 TX SP LANG VOICE COMM INDIV: CPT

## 2018-09-19 PROCEDURE — 90935 HEMODIALYSIS ONE EVALUATION: CPT

## 2018-09-19 PROCEDURE — 84100 ASSAY OF PHOSPHORUS: CPT

## 2018-09-19 PROCEDURE — 97530 THERAPEUTIC ACTIVITIES: CPT

## 2018-09-19 PROCEDURE — 97116 GAIT TRAINING THERAPY: CPT

## 2018-09-19 PROCEDURE — 97110 THERAPEUTIC EXERCISES: CPT

## 2018-09-19 PROCEDURE — 97112 NEUROMUSCULAR REEDUCATION: CPT

## 2018-09-19 PROCEDURE — 97542 WHEELCHAIR MNGMENT TRAINING: CPT

## 2018-09-19 PROCEDURE — 97163 PT EVAL HIGH COMPLEX 45 MIN: CPT

## 2018-09-19 PROCEDURE — 85025 COMPLETE CBC W/AUTO DIFF WBC: CPT

## 2018-09-19 RX ADMIN — SERTRALINE HYDROCHLORIDE SCH MG: 25 TABLET, FILM COATED ORAL at 16:13

## 2018-09-19 RX ADMIN — LOSARTAN POTASSIUM SCH MG: 50 TABLET, FILM COATED ORAL at 08:14

## 2018-09-19 RX ADMIN — ATORVASTATIN CALCIUM 1 MG: 80 TABLET, FILM COATED ORAL at 23:22

## 2018-09-19 RX ADMIN — Medication SCH MG: at 12:54

## 2018-09-19 RX ADMIN — LABETALOL 1 MG: 200 TABLET, FILM COATED ORAL at 23:22

## 2018-09-19 RX ADMIN — Medication SCH MG: at 08:13

## 2018-09-19 RX ADMIN — ASPIRIN SCH MG: 325 TABLET, FILM COATED ORAL at 08:13

## 2018-09-19 RX ADMIN — LABETALOL HCL SCH MG: 100 TABLET, FILM COATED ORAL at 08:14

## 2018-09-19 RX ADMIN — Medication SCH MG: at 16:12

## 2018-09-19 RX ADMIN — LORAZEPAM PRN MG: 2 INJECTION INTRAMUSCULAR; INTRAVENOUS at 18:38

## 2018-09-19 NOTE — NUR
MS/RN  CLOSING NOTE



PATIENT ALERT AND ORIENTED X4. IN ROOM AIR AND DENIES SOB. RESPIRATION REGULAR AND 
UNLABORED. DENIES PAIN. RAC G 20 PATENT AND SALINE LOCKED. PATIENT`S RIGHT ARM FLACCID, 
RIGHT LEG REGAINING STRENGTH. RIGHT CHEST WALL PERMA CATH DRESSING INTACT AND CLEAN, LEFT 
ARM AV SHUNT WITH STAPLES AND OPEN TO AIR. NO BLEEDING AND NO S/S INFECTION NOTED. NO FACIAL 
DROOPING NOTED. BED LOW AND LOCKED. SIDE RAILS UP X3. CALL LIGHT WITHIN REACH. WAITING FOR 
AMBULANCE FOR PATIENT TO GET DISCHARGED. WILL ENDORSE TO NIGHT SHIFT.

## 2018-09-19 NOTE — NUR
MS RN CLOSING NOTES



PATIENT IN BED RESTING, SLEPT WELL AT NIGHT, ORIENTED X3. ON ROOM AIR TOLERATING WELL. IN NO 
APPARENT DISTRESS OR DISCOMFORT NOTED. RESPIRATIONS EVEN AND UNLABORED. ABLE TO COMMUNICATE 
NEEDS CLEARLY. PATIENT STILL HAS RIGHT SIDED WEAKNESS, RIGHT ARM IS FLACCID, RIGHT LEG IS 
REGAINING STRENGTH SLOWLY ACCORDING TO THE PATIENT. NO FACIAL DROOPING OBSERVED. PATIENT 
WITH RIGHT CHEST WALL PERMA CATH, DRESSING CLEAN AND INTACT, LEFT ARM NEW AV SHUNT 
W/STAPLES, OPEN TO AIR, NO SIGN OF INFECTION. RIGHT AC 20 G SL PATENT AND INTACT. PATIENT 
KEPT CLEAN AND COMFORTABLE. ALL NEEDS ATTENDED. BED IN LOW LOCKED POSITION, SIDE RAILS UP 
X2, CALL LIGHT WITHIN EASY REACH, WILL ENDORSE TO AM NURSE FOR MERY.

## 2018-09-19 NOTE — NUR
MS RN NOTE



PT NOTED TO BE SLEEPING COMFORTABLY, ATIVAN WAS EFFECTIVE, NO MORE EPISODE OF ANXIETY NOTED. 
NO SOB, NO ACUTE DISTRESS NOTED.MONITORING CLOSELY.

## 2018-09-19 NOTE — NUR
MS/RN  OPENING NOTE



PATIENT IS RECEIVED IN BED. AWAKE, ALERT AND ORIENTED X4. IN ROOM AND THE PATIENT DENIES 
SOB. RESPIRATION REGULAR AND UNLABORED. DENIES PAIN. NO FACIAL DROOPING NOTED. RAC G 20 
PATENT AND SALINE LOCKED. PATIENT`S RIGHT ARM FLACCID, RIGHT LEG REGAINING STRENGTH AND ABLE 
TO MOVE UP AND DOWN. RIGHT CHEST WALL PERMA CATH DRESSING INTACT AND CLEAN, LEFT ARM AV 
SHUNT WITH STAPLES AND OPEN TO AIR. NO BLEEDING AND NO S/S INFECTION NOTED. BED LOW AND 
LOCKED. SIDE RAILS UP X3. CALL LIGHT WITHIN REACH. WILL CONTINUE TO MONITOR.

## 2018-09-20 LAB
ABNORMAL IP MESSAGE: 1
ADD MAN DIFF?: NO
ALANINE AMINOTRANSFERASE: 11 IU/L (ref 13–69)
ALBUMIN/GLOBULIN RATIO: 1.3
ALBUMIN: 3.9 G/DL (ref 3.3–4.9)
ALKALINE PHOSPHATASE: 52 IU/L (ref 42–121)
ANION GAP: 20 (ref 8–16)
ASPARTATE AMINO TRANSFERASE: 15 IU/L (ref 15–46)
BASOPHIL #: 0 10^3/UL (ref 0–0.1)
BASOPHILS %: 0.9 % (ref 0–2)
BILIRUBIN,DIRECT: 0 MG/DL (ref 0–0.2)
BILIRUBIN,TOTAL: 0 MG/DL (ref 0.2–1.3)
BLOOD UREA NITROGEN: 46 MG/DL (ref 7–20)
CALCIUM: 9.4 MG/DL (ref 8.4–10.2)
CARBON DIOXIDE: 25 MMOL/L (ref 21–31)
CHLORIDE: 94 MMOL/L (ref 97–110)
CREATININE: 8.64 MG/DL (ref 0.44–1)
EOSINOPHILS #: 0.3 10^3/UL (ref 0–0.5)
EOSINOPHILS %: 5.8 % (ref 0–7)
GLOBULIN: 3 G/DL (ref 1.3–3.2)
GLUCOSE: 115 MG/DL (ref 70–220)
HEMATOCRIT: 29.4 % (ref 37–47)
HEMOGLOBIN: 9.2 G/DL (ref 12–16)
IMMATURE GRANS #M: 0.02 10^3/UL (ref 0–0.03)
IMMATURE GRANS % (M): 0.4 % (ref 0–0.43)
LYMPHOCYTES #: 0.5 10^3/UL (ref 0.8–2.9)
LYMPHOCYTES %: 10.8 % (ref 15–51)
MEAN CORPUSCULAR HEMOGLOBIN: 30.3 PG (ref 29–33)
MEAN CORPUSCULAR HGB CONC: 31.3 G/DL (ref 32–37)
MEAN CORPUSCULAR VOLUME: 96.7 FL (ref 82–101)
MEAN PLATELET VOLUME: 9.6 FL (ref 7.4–10.4)
MONOCYTE #: 0.7 10^3/UL (ref 0.3–0.9)
MONOCYTES %: 15.3 % (ref 0–11)
NEUTROPHIL #: 3.1 10^3/UL (ref 1.6–7.5)
NEUTROPHILS %: 66.8 % (ref 39–77)
NUCLEATED RED BLOOD CELLS #: 0 10^3/UL (ref 0–0)
NUCLEATED RED BLOOD CELLS%: 0 /100WBC (ref 0–0)
PLATELET COUNT: 240 10^3/UL (ref 140–415)
POSITIVE DIFF: (no result)
POTASSIUM: 5.4 MMOL/L (ref 3.5–5.1)
RED BLOOD COUNT: 3.04 10^6/UL (ref 4.2–5.4)
RED CELL DISTRIBUTION WIDTH: 15.4 % (ref 11.5–14.5)
SODIUM: 134 MMOL/L (ref 135–144)
TOTAL PROTEIN: 6.9 G/DL (ref 6.1–8.1)
WHITE BLOOD COUNT: 4.6 10^3/UL (ref 4.8–10.8)

## 2018-09-20 RX ADMIN — CALCIUM ACETATE 1 MG: 667 CAPSULE ORAL at 08:48

## 2018-09-20 RX ADMIN — DOCUSATE SODIUM 1 MG: 100 CAPSULE, LIQUID FILLED ORAL at 21:00

## 2018-09-20 RX ADMIN — SENNOSIDES 1 TAB: 8.6 TABLET, FILM COATED ORAL at 21:00

## 2018-09-20 RX ADMIN — LORAZEPAM 1 MG: 1 TABLET ORAL at 21:23

## 2018-09-20 RX ADMIN — LABETALOL 1 MG: 200 TABLET, FILM COATED ORAL at 08:49

## 2018-09-20 RX ADMIN — LABETALOL 1 MG: 200 TABLET, FILM COATED ORAL at 21:23

## 2018-09-20 RX ADMIN — SERTRALINE HYDROCHLORIDE 1 MG: 50 TABLET ORAL at 17:00

## 2018-09-20 RX ADMIN — CALCIUM ACETATE 1 MG: 667 CAPSULE ORAL at 16:59

## 2018-09-20 RX ADMIN — ATORVASTATIN CALCIUM 1 MG: 80 TABLET, FILM COATED ORAL at 21:19

## 2018-09-20 RX ADMIN — DOCUSATE SODIUM 1 MG: 100 CAPSULE, LIQUID FILLED ORAL at 08:52

## 2018-09-20 RX ADMIN — ACETAMINOPHEN 1 MG: 325 TABLET, FILM COATED ORAL at 13:56

## 2018-09-20 RX ADMIN — LOSARTAN POTASSIUM 1 MG: 50 TABLET ORAL at 08:50

## 2018-09-20 RX ADMIN — LORAZEPAM 1 MG: 1 TABLET ORAL at 00:58

## 2018-09-20 RX ADMIN — CALCIUM ACETATE 1 MG: 667 CAPSULE ORAL at 12:02

## 2018-09-20 RX ADMIN — ASPIRIN 325 MG ORAL TABLET 1 MG: 325 PILL ORAL at 08:50

## 2018-09-21 LAB
HEPATITIS B SURFACE ANTIBODY: POSITIVE
HEPATITIS B SURFACE ANTIGEN: NEGATIVE

## 2018-09-21 PROCEDURE — 5A1D70Z PERFORMANCE OF URINARY FILTRATION, INTERMITTENT, LESS THAN 6 HOURS PER DAY: ICD-10-PCS

## 2018-09-21 RX ADMIN — HEPARIN SODIUM 1 UNIT: 5000 INJECTION, SOLUTION INTRAVENOUS; SUBCUTANEOUS at 20:58

## 2018-09-21 RX ADMIN — LORAZEPAM 1 MG: 1 TABLET ORAL at 23:57

## 2018-09-21 RX ADMIN — HEPARIN SODIUM 1 UNIT: 5000 INJECTION, SOLUTION INTRAVENOUS; SUBCUTANEOUS at 12:39

## 2018-09-21 RX ADMIN — LORAZEPAM 1 MG: 1 TABLET ORAL at 16:19

## 2018-09-21 RX ADMIN — ATORVASTATIN CALCIUM 1 MG: 80 TABLET, FILM COATED ORAL at 23:57

## 2018-09-21 RX ADMIN — LABETALOL 1 MG: 200 TABLET, FILM COATED ORAL at 20:56

## 2018-09-21 RX ADMIN — LOSARTAN POTASSIUM 1 MG: 50 TABLET ORAL at 12:36

## 2018-09-21 RX ADMIN — CALCIUM ACETATE 1 MG: 667 CAPSULE ORAL at 12:35

## 2018-09-21 RX ADMIN — CALCIUM ACETATE 1 MG: 667 CAPSULE ORAL at 09:14

## 2018-09-21 RX ADMIN — ACETAMINOPHEN 1 MG: 325 TABLET, FILM COATED ORAL at 09:23

## 2018-09-21 RX ADMIN — DOCUSATE SODIUM 1 MG: 100 CAPSULE, LIQUID FILLED ORAL at 23:57

## 2018-09-21 RX ADMIN — SENNOSIDES 1 TAB: 8.6 TABLET, FILM COATED ORAL at 23:57

## 2018-09-21 RX ADMIN — DOCUSATE SODIUM 1 MG: 100 CAPSULE, LIQUID FILLED ORAL at 09:14

## 2018-09-21 RX ADMIN — SERTRALINE HYDROCHLORIDE 1 MG: 50 TABLET ORAL at 09:14

## 2018-09-21 RX ADMIN — CALCIUM ACETATE 1 MG: 667 CAPSULE ORAL at 17:58

## 2018-09-21 RX ADMIN — ASPIRIN 325 MG ORAL TABLET 1 MG: 325 PILL ORAL at 09:14

## 2018-09-21 RX ADMIN — LABETALOL 1 MG: 200 TABLET, FILM COATED ORAL at 12:35

## 2018-09-22 LAB
ADD MAN DIFF?: NO
ANION GAP: 16 (ref 8–16)
BASOPHIL #: 0.1 10^3/UL (ref 0–0.1)
BASOPHILS %: 1.5 % (ref 0–2)
BLOOD UREA NITROGEN: 38 MG/DL (ref 7–20)
CALCIUM: 8.8 MG/DL (ref 8.4–10.2)
CARBON DIOXIDE: 26 MMOL/L (ref 21–31)
CHLORIDE: 96 MMOL/L (ref 97–110)
CREATININE: 6 MG/DL (ref 0.44–1)
EOSINOPHILS #: 0.2 10^3/UL (ref 0–0.5)
EOSINOPHILS %: 6.2 % (ref 0–7)
GLUCOSE: 89 MG/DL (ref 70–220)
HEMATOCRIT: 27.9 % (ref 37–47)
HEMOGLOBIN: 8.7 G/DL (ref 12–16)
IMMATURE GRANS #M: 0.03 10^3/UL (ref 0–0.03)
IMMATURE GRANS % (M): 0.9 % (ref 0–0.43)
LYMPHOCYTES #: 0.7 10^3/UL (ref 0.8–2.9)
LYMPHOCYTES %: 21.4 % (ref 15–51)
MAGNESIUM: 1.8 MG/DL (ref 1.7–2.5)
MEAN CORPUSCULAR HEMOGLOBIN: 29.9 PG (ref 29–33)
MEAN CORPUSCULAR HGB CONC: 31.2 G/DL (ref 32–37)
MEAN CORPUSCULAR VOLUME: 95.9 FL (ref 82–101)
MEAN PLATELET VOLUME: 10 FL (ref 7.4–10.4)
MONOCYTE #: 0.6 10^3/UL (ref 0.3–0.9)
MONOCYTES %: 18.6 % (ref 0–11)
NEUTROPHIL #: 1.7 10^3/UL (ref 1.6–7.5)
NEUTROPHILS %: 51.4 % (ref 39–77)
NUCLEATED RED BLOOD CELLS #: 0 10^3/UL (ref 0–0)
NUCLEATED RED BLOOD CELLS%: 0 /100WBC (ref 0–0)
PHOSPHORUS: 4 MG/DL (ref 2.5–4.9)
PLATELET COUNT: 220 10^3/UL (ref 140–415)
POTASSIUM: 4.8 MMOL/L (ref 3.5–5.1)
RED BLOOD COUNT: 2.91 10^6/UL (ref 4.2–5.4)
RED CELL DISTRIBUTION WIDTH: 15.2 % (ref 11.5–14.5)
SODIUM: 133 MMOL/L (ref 135–144)
WHITE BLOOD COUNT: 3.2 10^3/UL (ref 4.8–10.8)

## 2018-09-22 RX ADMIN — LABETALOL 1 MG: 200 TABLET, FILM COATED ORAL at 21:42

## 2018-09-22 RX ADMIN — ACETAMINOPHEN 1 MG: 325 TABLET, FILM COATED ORAL at 01:02

## 2018-09-22 RX ADMIN — ATORVASTATIN CALCIUM 1 MG: 80 TABLET, FILM COATED ORAL at 21:42

## 2018-09-22 RX ADMIN — SENNOSIDES 1 TAB: 8.6 TABLET, FILM COATED ORAL at 21:00

## 2018-09-22 RX ADMIN — ACETAMINOPHEN 1 MG: 325 TABLET, FILM COATED ORAL at 06:19

## 2018-09-22 RX ADMIN — LOSARTAN POTASSIUM 1 MG: 50 TABLET ORAL at 08:33

## 2018-09-22 RX ADMIN — HEPARIN SODIUM 1 UNIT: 5000 INJECTION, SOLUTION INTRAVENOUS; SUBCUTANEOUS at 21:00

## 2018-09-22 RX ADMIN — DOCUSATE SODIUM 1 MG: 100 CAPSULE, LIQUID FILLED ORAL at 21:00

## 2018-09-22 RX ADMIN — CALCIUM ACETATE 1 MG: 667 CAPSULE ORAL at 12:00

## 2018-09-22 RX ADMIN — SERTRALINE HYDROCHLORIDE 1 MG: 50 TABLET ORAL at 08:35

## 2018-09-22 RX ADMIN — CALCIUM ACETATE 1 MG: 667 CAPSULE ORAL at 20:04

## 2018-09-22 RX ADMIN — ACETAMINOPHEN 1 MG: 325 TABLET, FILM COATED ORAL at 22:24

## 2018-09-22 RX ADMIN — HEPARIN SODIUM 1 UNIT: 5000 INJECTION, SOLUTION INTRAVENOUS; SUBCUTANEOUS at 08:35

## 2018-09-22 RX ADMIN — CALCIUM ACETATE 1 MG: 667 CAPSULE ORAL at 07:35

## 2018-09-22 RX ADMIN — SENNOSIDES 1 TAB: 8.6 TABLET, FILM COATED ORAL at 00:13

## 2018-09-22 RX ADMIN — SUMATRIPTAN SUCCINATE 1 MG: 50 TABLET ORAL at 10:54

## 2018-09-22 RX ADMIN — LABETALOL 1 MG: 200 TABLET, FILM COATED ORAL at 00:13

## 2018-09-22 RX ADMIN — DOCUSATE SODIUM 1 MG: 100 CAPSULE, LIQUID FILLED ORAL at 00:13

## 2018-09-22 RX ADMIN — SERTRALINE HYDROCHLORIDE 1 MG: 50 TABLET ORAL at 18:20

## 2018-09-22 RX ADMIN — LABETALOL 1 MG: 200 TABLET, FILM COATED ORAL at 08:33

## 2018-09-22 RX ADMIN — ASPIRIN 325 MG ORAL TABLET 1 MG: 325 PILL ORAL at 08:32

## 2018-09-22 RX ADMIN — LORAZEPAM 1 MG: 1 TABLET ORAL at 23:50

## 2018-09-22 RX ADMIN — HEPARIN SODIUM 1 UNIT: 1000 INJECTION, SOLUTION INTRAVENOUS; SUBCUTANEOUS at 00:41

## 2018-09-22 RX ADMIN — DOCUSATE SODIUM 1 MG: 100 CAPSULE, LIQUID FILLED ORAL at 08:34

## 2018-09-23 RX ADMIN — LOSARTAN POTASSIUM 1 MG: 50 TABLET ORAL at 08:52

## 2018-09-23 RX ADMIN — LABETALOL 1 MG: 200 TABLET, FILM COATED ORAL at 08:53

## 2018-09-23 RX ADMIN — LABETALOL 1 MG: 200 TABLET, FILM COATED ORAL at 20:39

## 2018-09-23 RX ADMIN — ACETAMINOPHEN 1 MG: 325 TABLET, FILM COATED ORAL at 22:10

## 2018-09-23 RX ADMIN — ATORVASTATIN CALCIUM 1 MG: 80 TABLET, FILM COATED ORAL at 20:32

## 2018-09-23 RX ADMIN — HEPARIN SODIUM 1 UNIT: 5000 INJECTION, SOLUTION INTRAVENOUS; SUBCUTANEOUS at 20:43

## 2018-09-23 RX ADMIN — HEPARIN SODIUM 1 UNIT: 5000 INJECTION, SOLUTION INTRAVENOUS; SUBCUTANEOUS at 08:58

## 2018-09-23 RX ADMIN — CALCIUM ACETATE 1 MG: 667 CAPSULE ORAL at 08:51

## 2018-09-23 RX ADMIN — LORAZEPAM 1 MG: 1 TABLET ORAL at 20:31

## 2018-09-23 RX ADMIN — DOCUSATE SODIUM 1 MG: 100 CAPSULE, LIQUID FILLED ORAL at 20:32

## 2018-09-23 RX ADMIN — ACETAMINOPHEN 1 MG: 325 TABLET, FILM COATED ORAL at 08:27

## 2018-09-23 RX ADMIN — CALCIUM ACETATE 1 MG: 667 CAPSULE ORAL at 12:30

## 2018-09-23 RX ADMIN — DOCUSATE SODIUM 1 MG: 100 CAPSULE, LIQUID FILLED ORAL at 08:58

## 2018-09-23 RX ADMIN — SENNOSIDES 1 TAB: 8.6 TABLET, FILM COATED ORAL at 20:33

## 2018-09-23 RX ADMIN — ASPIRIN 325 MG ORAL TABLET 1 MG: 325 PILL ORAL at 08:51

## 2018-09-23 RX ADMIN — SERTRALINE HYDROCHLORIDE 1 MG: 50 TABLET ORAL at 08:53

## 2018-09-23 RX ADMIN — CALCIUM ACETATE 1 MG: 667 CAPSULE ORAL at 18:00

## 2018-09-24 LAB
ABNORMAL IP MESSAGE: 1
ADD MAN DIFF?: NO
ANION GAP: 24 (ref 8–16)
BASOPHIL #: 0.1 10^3/UL (ref 0–0.1)
BASOPHILS %: 1.1 % (ref 0–2)
BLOOD UREA NITROGEN: 82 MG/DL (ref 7–20)
CALCIUM: 9.9 MG/DL (ref 8.4–10.2)
CARBON DIOXIDE: 20 MMOL/L (ref 21–31)
CHLORIDE: 93 MMOL/L (ref 97–110)
CREATININE: 10.01 MG/DL (ref 0.44–1)
EOSINOPHILS #: 0.2 10^3/UL (ref 0–0.5)
EOSINOPHILS %: 3.9 % (ref 0–7)
GLUCOSE: 112 MG/DL (ref 70–220)
HEMATOCRIT: 30 % (ref 37–47)
HEMOGLOBIN: 9.5 G/DL (ref 12–16)
IMMATURE GRANS #M: 0.04 10^3/UL (ref 0–0.03)
IMMATURE GRANS % (M): 0.9 % (ref 0–0.43)
LYMPHOCYTES #: 0.6 10^3/UL (ref 0.8–2.9)
LYMPHOCYTES %: 12.5 % (ref 15–51)
MEAN CORPUSCULAR HEMOGLOBIN: 30.3 PG (ref 29–33)
MEAN CORPUSCULAR HGB CONC: 31.7 G/DL (ref 32–37)
MEAN CORPUSCULAR VOLUME: 95.5 FL (ref 82–101)
MEAN PLATELET VOLUME: 10 FL (ref 7.4–10.4)
MONOCYTE #: 0.4 10^3/UL (ref 0.3–0.9)
MONOCYTES %: 8.8 % (ref 0–11)
NEUTROPHIL #: 3.4 10^3/UL (ref 1.6–7.5)
NEUTROPHILS %: 72.8 % (ref 39–77)
NUCLEATED RED BLOOD CELLS #: 0 10^3/UL (ref 0–0)
NUCLEATED RED BLOOD CELLS%: 0 /100WBC (ref 0–0)
PLATELET COUNT: 238 10^3/UL (ref 140–415)
POSITIVE DIFF: (no result)
POTASSIUM: 6.2 MMOL/L (ref 3.5–5.1)
RED BLOOD COUNT: 3.14 10^6/UL (ref 4.2–5.4)
RED CELL DISTRIBUTION WIDTH: 14.8 % (ref 11.5–14.5)
SODIUM: 131 MMOL/L (ref 135–144)
WHITE BLOOD COUNT: 4.7 10^3/UL (ref 4.8–10.8)

## 2018-09-24 RX ADMIN — SUMATRIPTAN SUCCINATE 1 MG: 50 TABLET ORAL at 20:53

## 2018-09-24 RX ADMIN — CALCIUM ACETATE 1 MG: 667 CAPSULE ORAL at 09:10

## 2018-09-24 RX ADMIN — SERTRALINE HYDROCHLORIDE 1 MG: 50 TABLET ORAL at 09:09

## 2018-09-24 RX ADMIN — LABETALOL 1 MG: 200 TABLET, FILM COATED ORAL at 21:17

## 2018-09-24 RX ADMIN — CALCIUM ACETATE 1 MG: 667 CAPSULE ORAL at 18:12

## 2018-09-24 RX ADMIN — HEPARIN SODIUM 1 UNIT: 5000 INJECTION, SOLUTION INTRAVENOUS; SUBCUTANEOUS at 09:00

## 2018-09-24 RX ADMIN — DOCUSATE SODIUM 1 MG: 100 CAPSULE, LIQUID FILLED ORAL at 21:00

## 2018-09-24 RX ADMIN — CALCIUM ACETATE 1 MG: 667 CAPSULE ORAL at 12:00

## 2018-09-24 RX ADMIN — SUMATRIPTAN SUCCINATE 1 MG: 50 TABLET ORAL at 08:56

## 2018-09-24 RX ADMIN — LORAZEPAM 1 MG: 1 TABLET ORAL at 03:35

## 2018-09-24 RX ADMIN — SENNOSIDES 1 TAB: 8.6 TABLET, FILM COATED ORAL at 21:00

## 2018-09-24 RX ADMIN — HEPARIN SODIUM 1 UNIT: 1000 INJECTION, SOLUTION INTRAVENOUS; SUBCUTANEOUS at 20:08

## 2018-09-24 RX ADMIN — ASPIRIN 325 MG ORAL TABLET 1 MG: 325 PILL ORAL at 09:10

## 2018-09-24 RX ADMIN — DOCUSATE SODIUM 1 MG: 100 CAPSULE, LIQUID FILLED ORAL at 09:00

## 2018-09-24 RX ADMIN — EPOETIN ALFA 1 UNITS: 10000 SOLUTION INTRAVENOUS; SUBCUTANEOUS at 18:13

## 2018-09-24 RX ADMIN — LABETALOL 1 MG: 200 TABLET, FILM COATED ORAL at 09:09

## 2018-09-24 RX ADMIN — LOSARTAN POTASSIUM 1 MG: 50 TABLET ORAL at 09:09

## 2018-09-24 RX ADMIN — HEPARIN SODIUM 1 UNIT: 5000 INJECTION, SOLUTION INTRAVENOUS; SUBCUTANEOUS at 21:00

## 2018-09-24 RX ADMIN — ATORVASTATIN CALCIUM 1 MG: 80 TABLET, FILM COATED ORAL at 21:17

## 2018-09-25 RX ADMIN — CALCIUM ACETATE 1 MG: 667 CAPSULE ORAL at 09:31

## 2018-09-25 RX ADMIN — ATORVASTATIN CALCIUM 1 MG: 80 TABLET, FILM COATED ORAL at 20:27

## 2018-09-25 RX ADMIN — SERTRALINE HYDROCHLORIDE 1 MG: 50 TABLET ORAL at 09:32

## 2018-09-25 RX ADMIN — CALCIUM ACETATE 1 MG: 667 CAPSULE ORAL at 13:22

## 2018-09-25 RX ADMIN — SENNOSIDES 1 TAB: 8.6 TABLET, FILM COATED ORAL at 21:00

## 2018-09-25 RX ADMIN — HEPARIN SODIUM 1 UNIT: 5000 INJECTION, SOLUTION INTRAVENOUS; SUBCUTANEOUS at 20:30

## 2018-09-25 RX ADMIN — DOCUSATE SODIUM 1 MG: 100 CAPSULE, LIQUID FILLED ORAL at 21:00

## 2018-09-25 RX ADMIN — DOCUSATE SODIUM 1 MG: 100 CAPSULE, LIQUID FILLED ORAL at 09:00

## 2018-09-25 RX ADMIN — ASPIRIN 325 MG ORAL TABLET 1 MG: 325 PILL ORAL at 09:32

## 2018-09-25 RX ADMIN — ACETAMINOPHEN 1 MG: 325 TABLET, FILM COATED ORAL at 08:55

## 2018-09-25 RX ADMIN — LABETALOL 1 MG: 200 TABLET, FILM COATED ORAL at 09:34

## 2018-09-25 RX ADMIN — LOSARTAN POTASSIUM 1 MG: 50 TABLET ORAL at 09:33

## 2018-09-25 RX ADMIN — HEPARIN SODIUM 1 UNIT: 5000 INJECTION, SOLUTION INTRAVENOUS; SUBCUTANEOUS at 09:35

## 2018-09-25 RX ADMIN — LORAZEPAM 1 MG: 1 TABLET ORAL at 21:57

## 2018-09-25 RX ADMIN — CALCIUM ACETATE 1 MG: 667 CAPSULE ORAL at 17:31

## 2018-09-25 RX ADMIN — LABETALOL 1 MG: 200 TABLET, FILM COATED ORAL at 20:27

## 2018-09-26 LAB
ANION GAP: 18 (ref 8–16)
BLOOD UREA NITROGEN: 61 MG/DL (ref 7–20)
CALCIUM: 9.8 MG/DL (ref 8.4–10.2)
CARBON DIOXIDE: 24 MMOL/L (ref 21–31)
CHLORIDE: 100 MMOL/L (ref 97–110)
CREATININE: 7.91 MG/DL (ref 0.44–1)
GLUCOSE: 101 MG/DL (ref 70–220)
MAGNESIUM: 1.9 MG/DL (ref 1.7–2.5)
PHOSPHORUS: 4.5 MG/DL (ref 2.5–4.9)
POTASSIUM: 5.1 MMOL/L (ref 3.5–5.1)
SODIUM: 137 MMOL/L (ref 135–144)

## 2018-09-26 RX ADMIN — CALCIUM ACETATE 1 MG: 667 CAPSULE ORAL at 12:31

## 2018-09-26 RX ADMIN — EPOETIN ALFA 1 UNITS: 10000 SOLUTION INTRAVENOUS; SUBCUTANEOUS at 18:11

## 2018-09-26 RX ADMIN — ASPIRIN 325 MG ORAL TABLET 1 MG: 325 PILL ORAL at 08:42

## 2018-09-26 RX ADMIN — DOCUSATE SODIUM 1 MG: 100 CAPSULE, LIQUID FILLED ORAL at 08:44

## 2018-09-26 RX ADMIN — HEPARIN SODIUM 1 UNIT: 1000 INJECTION, SOLUTION INTRAVENOUS; SUBCUTANEOUS at 18:00

## 2018-09-26 RX ADMIN — LABETALOL 1 MG: 200 TABLET, FILM COATED ORAL at 08:45

## 2018-09-26 RX ADMIN — DOCUSATE SODIUM 1 MG: 100 CAPSULE, LIQUID FILLED ORAL at 20:49

## 2018-09-26 RX ADMIN — HEPARIN SODIUM 1 UNIT: 5000 INJECTION, SOLUTION INTRAVENOUS; SUBCUTANEOUS at 20:50

## 2018-09-26 RX ADMIN — HEPARIN SODIUM 1 UNIT: 5000 INJECTION, SOLUTION INTRAVENOUS; SUBCUTANEOUS at 08:45

## 2018-09-26 RX ADMIN — CALCIUM ACETATE 1 MG: 667 CAPSULE ORAL at 18:10

## 2018-09-26 RX ADMIN — ATORVASTATIN CALCIUM 1 MG: 80 TABLET, FILM COATED ORAL at 20:47

## 2018-09-26 RX ADMIN — LOSARTAN POTASSIUM 1 MG: 50 TABLET ORAL at 08:43

## 2018-09-26 RX ADMIN — SERTRALINE HYDROCHLORIDE 1 MG: 50 TABLET ORAL at 08:43

## 2018-09-26 RX ADMIN — SENNOSIDES 1 TAB: 8.6 TABLET, FILM COATED ORAL at 20:49

## 2018-09-26 RX ADMIN — LORAZEPAM 1 MG: 1 TABLET ORAL at 17:23

## 2018-09-26 RX ADMIN — LABETALOL 1 MG: 200 TABLET, FILM COATED ORAL at 20:47

## 2018-09-26 RX ADMIN — CALCIUM ACETATE 1 MG: 667 CAPSULE ORAL at 08:44

## 2018-09-27 RX ADMIN — CALCIUM ACETATE 1 MG: 667 CAPSULE ORAL at 12:00

## 2018-09-27 RX ADMIN — DOCUSATE SODIUM 1 MG: 100 CAPSULE, LIQUID FILLED ORAL at 10:04

## 2018-09-27 RX ADMIN — LABETALOL 1 MG: 200 TABLET, FILM COATED ORAL at 10:04

## 2018-09-27 RX ADMIN — CALCIUM ACETATE 1 MG: 667 CAPSULE ORAL at 07:51

## 2018-09-27 RX ADMIN — LOSARTAN POTASSIUM 1 MG: 50 TABLET ORAL at 10:04

## 2018-09-27 RX ADMIN — ATORVASTATIN CALCIUM 1 MG: 80 TABLET, FILM COATED ORAL at 21:27

## 2018-09-27 RX ADMIN — DOCUSATE SODIUM 1 MG: 100 CAPSULE, LIQUID FILLED ORAL at 21:00

## 2018-09-27 RX ADMIN — LORAZEPAM 1 MG: 1 TABLET ORAL at 21:27

## 2018-09-27 RX ADMIN — SERTRALINE HYDROCHLORIDE 1 MG: 50 TABLET ORAL at 10:03

## 2018-09-27 RX ADMIN — LABETALOL 1 MG: 200 TABLET, FILM COATED ORAL at 21:26

## 2018-09-27 RX ADMIN — ASPIRIN 325 MG ORAL TABLET 1 MG: 325 PILL ORAL at 10:03

## 2018-09-27 RX ADMIN — SENNOSIDES 1 TAB: 8.6 TABLET, FILM COATED ORAL at 21:00

## 2018-09-27 RX ADMIN — ACETAMINOPHEN 1 MG: 325 TABLET, FILM COATED ORAL at 07:52

## 2018-09-27 RX ADMIN — CALCIUM ACETATE 1 MG: 667 CAPSULE ORAL at 17:24

## 2018-09-27 RX ADMIN — HEPARIN SODIUM 1 UNIT: 5000 INJECTION, SOLUTION INTRAVENOUS; SUBCUTANEOUS at 09:00

## 2018-09-27 RX ADMIN — HEPARIN SODIUM 1 UNIT: 5000 INJECTION, SOLUTION INTRAVENOUS; SUBCUTANEOUS at 21:00

## 2018-09-28 RX ADMIN — ACETAMINOPHEN 1 MG: 325 TABLET, FILM COATED ORAL at 23:54

## 2018-09-28 RX ADMIN — CALCIUM ACETATE 1 MG: 667 CAPSULE ORAL at 17:25

## 2018-09-28 RX ADMIN — ASPIRIN 325 MG ORAL TABLET 1 MG: 325 PILL ORAL at 09:30

## 2018-09-28 RX ADMIN — LABETALOL 1 MG: 200 TABLET, FILM COATED ORAL at 09:31

## 2018-09-28 RX ADMIN — CALCIUM ACETATE 1 MG: 667 CAPSULE ORAL at 11:46

## 2018-09-28 RX ADMIN — LOSARTAN POTASSIUM 1 MG: 50 TABLET ORAL at 09:31

## 2018-09-28 RX ADMIN — LORAZEPAM 1 MG: 1 TABLET ORAL at 22:42

## 2018-09-28 RX ADMIN — HEPARIN SODIUM 1 UNIT: 5000 INJECTION, SOLUTION INTRAVENOUS; SUBCUTANEOUS at 08:24

## 2018-09-28 RX ADMIN — CALCIUM ACETATE 1 MG: 667 CAPSULE ORAL at 08:09

## 2018-09-28 RX ADMIN — SENNOSIDES 1 TAB: 8.6 TABLET, FILM COATED ORAL at 21:32

## 2018-09-28 RX ADMIN — DOCUSATE SODIUM 1 MG: 100 CAPSULE, LIQUID FILLED ORAL at 21:32

## 2018-09-28 RX ADMIN — SERTRALINE HYDROCHLORIDE 1 MG: 50 TABLET ORAL at 09:00

## 2018-09-28 RX ADMIN — LABETALOL 1 MG: 200 TABLET, FILM COATED ORAL at 21:32

## 2018-09-28 RX ADMIN — ATORVASTATIN CALCIUM 1 MG: 80 TABLET, FILM COATED ORAL at 21:32

## 2018-09-28 RX ADMIN — HEPARIN SODIUM 1 UNIT: 1000 INJECTION, SOLUTION INTRAVENOUS; SUBCUTANEOUS at 20:26

## 2018-09-28 RX ADMIN — DOCUSATE SODIUM 1 MG: 100 CAPSULE, LIQUID FILLED ORAL at 08:23

## 2018-09-28 RX ADMIN — HEPARIN SODIUM 1 UNIT: 5000 INJECTION, SOLUTION INTRAVENOUS; SUBCUTANEOUS at 21:00

## 2018-09-28 RX ADMIN — EPOETIN ALFA 1 UNITS: 10000 SOLUTION INTRAVENOUS; SUBCUTANEOUS at 17:25

## 2018-09-29 RX ADMIN — SENNOSIDES 1 TAB: 8.6 TABLET, FILM COATED ORAL at 20:49

## 2018-09-29 RX ADMIN — DOCUSATE SODIUM 1 MG: 100 CAPSULE, LIQUID FILLED ORAL at 20:50

## 2018-09-29 RX ADMIN — LORAZEPAM 1 MG: 1 TABLET ORAL at 20:45

## 2018-09-29 RX ADMIN — CALCIUM ACETATE 1 MG: 667 CAPSULE ORAL at 17:17

## 2018-09-29 RX ADMIN — SERTRALINE HYDROCHLORIDE 1 MG: 50 TABLET ORAL at 08:59

## 2018-09-29 RX ADMIN — ZOLPIDEM TARTRATE 1 MG: 5 TABLET, FILM COATED ORAL at 22:00

## 2018-09-29 RX ADMIN — CALCIUM ACETATE 1 MG: 667 CAPSULE ORAL at 07:42

## 2018-09-29 RX ADMIN — LOSARTAN POTASSIUM 1 MG: 50 TABLET ORAL at 09:01

## 2018-09-29 RX ADMIN — HEPARIN SODIUM 1 UNIT: 5000 INJECTION, SOLUTION INTRAVENOUS; SUBCUTANEOUS at 09:00

## 2018-09-29 RX ADMIN — LABETALOL 1 MG: 200 TABLET, FILM COATED ORAL at 20:45

## 2018-09-29 RX ADMIN — ATORVASTATIN CALCIUM 1 MG: 80 TABLET, FILM COATED ORAL at 20:45

## 2018-09-29 RX ADMIN — CALCIUM ACETATE 1 MG: 667 CAPSULE ORAL at 12:18

## 2018-09-29 RX ADMIN — HEPARIN SODIUM 1 UNIT: 5000 INJECTION, SOLUTION INTRAVENOUS; SUBCUTANEOUS at 20:48

## 2018-09-29 RX ADMIN — LABETALOL 1 MG: 200 TABLET, FILM COATED ORAL at 09:01

## 2018-09-29 RX ADMIN — DOCUSATE SODIUM 1 MG: 100 CAPSULE, LIQUID FILLED ORAL at 08:59

## 2018-09-29 RX ADMIN — ASPIRIN 325 MG ORAL TABLET 1 MG: 325 PILL ORAL at 09:00

## 2018-09-30 RX ADMIN — ZOLPIDEM TARTRATE 1 MG: 5 TABLET, FILM COATED ORAL at 22:10

## 2018-09-30 RX ADMIN — LORAZEPAM 1 MG: 1 TABLET ORAL at 21:13

## 2018-09-30 RX ADMIN — ASPIRIN 325 MG ORAL TABLET 1 MG: 325 PILL ORAL at 08:45

## 2018-09-30 RX ADMIN — DOCUSATE SODIUM 1 MG: 100 CAPSULE, LIQUID FILLED ORAL at 08:48

## 2018-09-30 RX ADMIN — HEPARIN SODIUM 1 UNIT: 5000 INJECTION, SOLUTION INTRAVENOUS; SUBCUTANEOUS at 08:49

## 2018-09-30 RX ADMIN — SERTRALINE HYDROCHLORIDE 1 MG: 50 TABLET ORAL at 08:49

## 2018-09-30 RX ADMIN — ACETAMINOPHEN 1 MG: 325 TABLET, FILM COATED ORAL at 11:16

## 2018-09-30 RX ADMIN — LOSARTAN POTASSIUM 1 MG: 50 TABLET ORAL at 08:46

## 2018-09-30 RX ADMIN — CALCIUM ACETATE 1 MG: 667 CAPSULE ORAL at 07:42

## 2018-09-30 RX ADMIN — LABETALOL 1 MG: 200 TABLET, FILM COATED ORAL at 08:45

## 2018-09-30 RX ADMIN — HEPARIN SODIUM 1 UNIT: 5000 INJECTION, SOLUTION INTRAVENOUS; SUBCUTANEOUS at 20:32

## 2018-09-30 RX ADMIN — CALCIUM ACETATE 1 MG: 667 CAPSULE ORAL at 12:00

## 2018-09-30 RX ADMIN — LABETALOL 1 MG: 200 TABLET, FILM COATED ORAL at 20:32

## 2018-09-30 RX ADMIN — SENNOSIDES 1 TAB: 8.6 TABLET, FILM COATED ORAL at 20:32

## 2018-09-30 RX ADMIN — CALCIUM ACETATE 1 MG: 667 CAPSULE ORAL at 17:22

## 2018-09-30 RX ADMIN — DOCUSATE SODIUM 1 MG: 100 CAPSULE, LIQUID FILLED ORAL at 20:32

## 2018-09-30 RX ADMIN — ATORVASTATIN CALCIUM 1 MG: 80 TABLET, FILM COATED ORAL at 20:31

## 2018-10-01 LAB
ADD MAN DIFF?: NO
ANION GAP: 20 (ref 8–16)
BASOPHIL #: 0 10^3/UL (ref 0–0.1)
BASOPHILS %: 0.7 % (ref 0–2)
BLOOD UREA NITROGEN: 68 MG/DL (ref 7–20)
CALCIUM: 9.6 MG/DL (ref 8.4–10.2)
CARBON DIOXIDE: 22 MMOL/L (ref 21–31)
CHLORIDE: 99 MMOL/L (ref 97–110)
CREATININE: 9.51 MG/DL (ref 0.44–1)
EOSINOPHILS #: 0.2 10^3/UL (ref 0–0.5)
EOSINOPHILS %: 3.9 % (ref 0–7)
GLUCOSE: 132 MG/DL (ref 70–220)
HEMATOCRIT: 28.1 % (ref 37–47)
HEMOGLOBIN: 8.9 G/DL (ref 12–16)
IMMATURE GRANS #M: 0.03 10^3/UL (ref 0–0.03)
IMMATURE GRANS % (M): 0.5 % (ref 0–0.43)
LYMPHOCYTES #: 0.6 10^3/UL (ref 0.8–2.9)
LYMPHOCYTES %: 11.2 % (ref 15–51)
MAGNESIUM: 2 MG/DL (ref 1.7–2.5)
MEAN CORPUSCULAR HEMOGLOBIN: 31 PG (ref 29–33)
MEAN CORPUSCULAR HGB CONC: 31.7 G/DL (ref 32–37)
MEAN CORPUSCULAR VOLUME: 97.9 FL (ref 82–101)
MEAN PLATELET VOLUME: 10.3 FL (ref 7.4–10.4)
MONOCYTE #: 0.5 10^3/UL (ref 0.3–0.9)
MONOCYTES %: 8.4 % (ref 0–11)
NEUTROPHIL #: 4.2 10^3/UL (ref 1.6–7.5)
NEUTROPHILS %: 75.3 % (ref 39–77)
NUCLEATED RED BLOOD CELLS #: 0 10^3/UL (ref 0–0)
NUCLEATED RED BLOOD CELLS%: 0 /100WBC (ref 0–0)
PHOSPHORUS: 5 MG/DL (ref 2.5–4.9)
PLATELET COUNT: 222 10^3/UL (ref 140–415)
POTASSIUM: 5.1 MMOL/L (ref 3.5–5.1)
RED BLOOD COUNT: 2.87 10^6/UL (ref 4.2–5.4)
RED CELL DISTRIBUTION WIDTH: 15.8 % (ref 11.5–14.5)
SODIUM: 136 MMOL/L (ref 135–144)
WHITE BLOOD COUNT: 5.6 10^3/UL (ref 4.8–10.8)

## 2018-10-01 RX ADMIN — ATORVASTATIN CALCIUM 1 MG: 80 TABLET, FILM COATED ORAL at 20:50

## 2018-10-01 RX ADMIN — HEPARIN SODIUM 1 UNIT: 5000 INJECTION, SOLUTION INTRAVENOUS; SUBCUTANEOUS at 20:48

## 2018-10-01 RX ADMIN — LABETALOL 1 MG: 200 TABLET, FILM COATED ORAL at 09:02

## 2018-10-01 RX ADMIN — LABETALOL 1 MG: 200 TABLET, FILM COATED ORAL at 09:07

## 2018-10-01 RX ADMIN — SENNOSIDES 1 TAB: 8.6 TABLET, FILM COATED ORAL at 20:52

## 2018-10-01 RX ADMIN — DOCUSATE SODIUM 1 MG: 100 CAPSULE, LIQUID FILLED ORAL at 09:04

## 2018-10-01 RX ADMIN — SERTRALINE HYDROCHLORIDE 1 MG: 50 TABLET ORAL at 09:00

## 2018-10-01 RX ADMIN — ASPIRIN 325 MG ORAL TABLET 1 MG: 325 PILL ORAL at 09:04

## 2018-10-01 RX ADMIN — ACETAMINOPHEN 1 MG: 325 TABLET, FILM COATED ORAL at 11:12

## 2018-10-01 RX ADMIN — ACETAMINOPHEN 1 MG: 325 TABLET, FILM COATED ORAL at 17:20

## 2018-10-01 RX ADMIN — DOCUSATE SODIUM 1 MG: 100 CAPSULE, LIQUID FILLED ORAL at 20:52

## 2018-10-01 RX ADMIN — LOSARTAN POTASSIUM 1 MG: 50 TABLET ORAL at 09:04

## 2018-10-01 RX ADMIN — CALCIUM ACETATE 1 MG: 667 CAPSULE ORAL at 12:22

## 2018-10-01 RX ADMIN — HEPARIN SODIUM 1 UNIT: 5000 INJECTION, SOLUTION INTRAVENOUS; SUBCUTANEOUS at 09:00

## 2018-10-01 RX ADMIN — CALCIUM ACETATE 1 MG: 667 CAPSULE ORAL at 17:20

## 2018-10-01 RX ADMIN — CALCIUM ACETATE 1 MG: 667 CAPSULE ORAL at 08:09

## 2018-10-01 RX ADMIN — HEPARIN SODIUM 1 UNIT: 1000 INJECTION, SOLUTION INTRAVENOUS; SUBCUTANEOUS at 16:01

## 2018-10-01 RX ADMIN — DOCUSATE SODIUM 1 MG: 100 CAPSULE, LIQUID FILLED ORAL at 09:00

## 2018-10-01 RX ADMIN — LABETALOL 1 MG: 200 TABLET, FILM COATED ORAL at 20:52

## 2018-10-01 RX ADMIN — LORAZEPAM 1 MG: 1 TABLET ORAL at 20:54

## 2018-10-01 RX ADMIN — SERTRALINE HYDROCHLORIDE 1 MG: 50 TABLET ORAL at 09:03

## 2018-10-02 RX ADMIN — DOCUSATE SODIUM 1 MG: 100 CAPSULE, LIQUID FILLED ORAL at 20:41

## 2018-10-02 RX ADMIN — ATORVASTATIN CALCIUM 1 MG: 80 TABLET, FILM COATED ORAL at 20:41

## 2018-10-02 RX ADMIN — LOSARTAN POTASSIUM 1 MG: 50 TABLET ORAL at 09:27

## 2018-10-02 RX ADMIN — SENNOSIDES 1 TAB: 8.6 TABLET, FILM COATED ORAL at 20:41

## 2018-10-02 RX ADMIN — CALCIUM ACETATE 1 MG: 667 CAPSULE ORAL at 18:04

## 2018-10-02 RX ADMIN — DOCUSATE SODIUM 1 MG: 100 CAPSULE, LIQUID FILLED ORAL at 09:00

## 2018-10-02 RX ADMIN — LABETALOL 1 MG: 200 TABLET, FILM COATED ORAL at 09:26

## 2018-10-02 RX ADMIN — LABETALOL 1 MG: 200 TABLET, FILM COATED ORAL at 20:42

## 2018-10-02 RX ADMIN — SERTRALINE HYDROCHLORIDE 1 MG: 50 TABLET ORAL at 09:00

## 2018-10-02 RX ADMIN — LORAZEPAM 1 MG: 1 TABLET ORAL at 20:43

## 2018-10-02 RX ADMIN — ASPIRIN 325 MG ORAL TABLET 1 MG: 325 PILL ORAL at 09:27

## 2018-10-02 RX ADMIN — CALCIUM ACETATE 1 MG: 667 CAPSULE ORAL at 08:28

## 2018-10-02 RX ADMIN — HEPARIN SODIUM 1 UNIT: 5000 INJECTION, SOLUTION INTRAVENOUS; SUBCUTANEOUS at 20:41

## 2018-10-02 RX ADMIN — HEPARIN SODIUM 1 UNIT: 5000 INJECTION, SOLUTION INTRAVENOUS; SUBCUTANEOUS at 09:00

## 2018-10-02 RX ADMIN — CALCIUM ACETATE 1 MG: 667 CAPSULE ORAL at 12:26

## 2018-10-03 RX ADMIN — LORAZEPAM 1 MG: 1 TABLET ORAL at 20:35

## 2018-10-03 RX ADMIN — ACETAMINOPHEN 1 MG: 325 TABLET, FILM COATED ORAL at 19:32

## 2018-10-03 RX ADMIN — SUMATRIPTAN SUCCINATE 1 MG: 50 TABLET ORAL at 20:39

## 2018-10-03 RX ADMIN — LOSARTAN POTASSIUM 1 MG: 50 TABLET ORAL at 09:00

## 2018-10-03 RX ADMIN — LABETALOL 1 MG: 200 TABLET, FILM COATED ORAL at 09:44

## 2018-10-03 RX ADMIN — CALCIUM ACETATE 1 MG: 667 CAPSULE ORAL at 17:26

## 2018-10-03 RX ADMIN — CALCIUM ACETATE 1 MG: 667 CAPSULE ORAL at 09:40

## 2018-10-03 RX ADMIN — HEPARIN SODIUM 1 UNIT: 1000 INJECTION, SOLUTION INTRAVENOUS; SUBCUTANEOUS at 18:43

## 2018-10-03 RX ADMIN — ASPIRIN 325 MG ORAL TABLET 1 MG: 325 PILL ORAL at 09:41

## 2018-10-03 RX ADMIN — CALCIUM ACETATE 1 MG: 667 CAPSULE ORAL at 13:36

## 2018-10-03 RX ADMIN — HEPARIN SODIUM 1 UNIT: 5000 INJECTION, SOLUTION INTRAVENOUS; SUBCUTANEOUS at 09:00

## 2018-10-03 RX ADMIN — ZOLPIDEM TARTRATE 1 MG: 5 TABLET, FILM COATED ORAL at 22:59

## 2018-10-03 RX ADMIN — SENNOSIDES 1 TAB: 8.6 TABLET, FILM COATED ORAL at 21:00

## 2018-10-03 RX ADMIN — ATORVASTATIN CALCIUM 1 MG: 80 TABLET, FILM COATED ORAL at 20:35

## 2018-10-03 RX ADMIN — HEPARIN SODIUM 1 UNIT: 5000 INJECTION, SOLUTION INTRAVENOUS; SUBCUTANEOUS at 21:00

## 2018-10-03 RX ADMIN — DOCUSATE SODIUM 1 MG: 100 CAPSULE, LIQUID FILLED ORAL at 21:00

## 2018-10-03 RX ADMIN — ZOLPIDEM TARTRATE 1 MG: 5 TABLET, FILM COATED ORAL at 00:24

## 2018-10-03 RX ADMIN — LABETALOL 1 MG: 200 TABLET, FILM COATED ORAL at 20:35

## 2018-10-03 RX ADMIN — SERTRALINE HYDROCHLORIDE 1 MG: 50 TABLET ORAL at 09:42

## 2018-10-03 RX ADMIN — DOCUSATE SODIUM 1 MG: 100 CAPSULE, LIQUID FILLED ORAL at 09:00

## 2018-10-04 RX ADMIN — SERTRALINE HYDROCHLORIDE 1 MG: 50 TABLET ORAL at 09:00

## 2018-10-04 RX ADMIN — LOSARTAN POTASSIUM 1 MG: 50 TABLET ORAL at 09:48

## 2018-10-04 RX ADMIN — CALCIUM ACETATE 1 MG: 667 CAPSULE ORAL at 13:52

## 2018-10-04 RX ADMIN — CALCIUM ACETATE 1 MG: 667 CAPSULE ORAL at 08:34

## 2018-10-04 RX ADMIN — LABETALOL 1 MG: 200 TABLET, FILM COATED ORAL at 09:47

## 2018-10-04 RX ADMIN — ACETAMINOPHEN 1 MG: 325 TABLET, FILM COATED ORAL at 08:33

## 2018-10-04 RX ADMIN — ASPIRIN 325 MG ORAL TABLET 1 MG: 325 PILL ORAL at 09:45

## 2018-10-04 RX ADMIN — HEPARIN SODIUM 1 UNIT: 5000 INJECTION, SOLUTION INTRAVENOUS; SUBCUTANEOUS at 09:00

## 2018-10-04 RX ADMIN — DOCUSATE SODIUM 1 MG: 100 CAPSULE, LIQUID FILLED ORAL at 09:00

## 2018-11-16 ENCOUNTER — HOSPITAL ENCOUNTER (EMERGENCY)
Dept: HOSPITAL 54 - ER | Age: 53
Discharge: HOME | End: 2018-11-16
Payer: MEDICARE

## 2018-11-16 VITALS — HEIGHT: 67 IN | WEIGHT: 148 LBS | BODY MASS INDEX: 23.23 KG/M2

## 2018-11-16 VITALS — SYSTOLIC BLOOD PRESSURE: 163 MMHG | DIASTOLIC BLOOD PRESSURE: 91 MMHG

## 2018-11-16 DIAGNOSIS — I16.0: ICD-10-CM

## 2018-11-16 DIAGNOSIS — N18.6: Primary | ICD-10-CM

## 2018-11-16 DIAGNOSIS — I12.0: ICD-10-CM

## 2018-11-16 DIAGNOSIS — R53.1: ICD-10-CM

## 2018-11-16 DIAGNOSIS — R51: ICD-10-CM

## 2018-11-16 DIAGNOSIS — I67.2: ICD-10-CM

## 2018-11-16 DIAGNOSIS — Z86.73: ICD-10-CM

## 2018-11-16 DIAGNOSIS — Z88.5: ICD-10-CM

## 2018-11-16 DIAGNOSIS — R94.31: ICD-10-CM

## 2018-11-16 LAB
ALBUMIN SERPL BCP-MCNC: 3.5 G/DL (ref 3.4–5)
ALP SERPL-CCNC: 44 U/L (ref 46–116)
ALT SERPL W P-5'-P-CCNC: 8 U/L (ref 12–78)
AST SERPL W P-5'-P-CCNC: 12 U/L (ref 15–37)
BASOPHILS # BLD AUTO: 0 /CMM (ref 0–0.2)
BASOPHILS NFR BLD AUTO: 0.8 % (ref 0–2)
BILIRUB DIRECT SERPL-MCNC: 0.1 MG/DL (ref 0–0.2)
BILIRUB SERPL-MCNC: 0.4 MG/DL (ref 0.2–1)
BUN SERPL-MCNC: 37 MG/DL (ref 7–18)
CALCIUM SERPL-MCNC: 9.1 MG/DL (ref 8.5–10.1)
CHLORIDE SERPL-SCNC: 100 MMOL/L (ref 98–107)
CO2 SERPL-SCNC: 24 MMOL/L (ref 21–32)
CREAT SERPL-MCNC: 6.8 MG/DL (ref 0.6–1.3)
EOSINOPHIL NFR BLD AUTO: 3.3 % (ref 0–6)
GLUCOSE SERPL-MCNC: 103 MG/DL (ref 74–106)
HCT VFR BLD AUTO: 36 % (ref 33–45)
HGB BLD-MCNC: 11.7 G/DL (ref 11.5–14.8)
LYMPHOCYTES NFR BLD AUTO: 0.4 /CMM (ref 0.8–4.8)
LYMPHOCYTES NFR BLD AUTO: 7.8 % (ref 20–44)
MCHC RBC AUTO-ENTMCNC: 33 G/DL (ref 31–36)
MCV RBC AUTO: 96 FL (ref 82–100)
MONOCYTES NFR BLD AUTO: 0.5 /CMM (ref 0.1–1.3)
MONOCYTES NFR BLD AUTO: 11.3 % (ref 2–12)
NEUTROPHILS # BLD AUTO: 3.6 /CMM (ref 1.8–8.9)
NEUTROPHILS NFR BLD AUTO: 76.8 % (ref 43–81)
PLATELET # BLD AUTO: 140 /CMM (ref 150–450)
POTASSIUM SERPL-SCNC: 4.3 MMOL/L (ref 3.5–5.1)
PROT SERPL-MCNC: 6.8 G/DL (ref 6.4–8.2)
RBC # BLD AUTO: 3.73 MIL/UL (ref 4–5.2)
SODIUM SERPL-SCNC: 138 MMOL/L (ref 136–145)
WBC NRBC COR # BLD AUTO: 4.7 K/UL (ref 4.3–11)

## 2018-11-16 PROCEDURE — 93005 ELECTROCARDIOGRAM TRACING: CPT

## 2018-11-16 PROCEDURE — A4606 OXYGEN PROBE USED W OXIMETER: HCPCS

## 2018-11-16 PROCEDURE — Z7610: HCPCS

## 2018-11-16 PROCEDURE — 70450 CT HEAD/BRAIN W/O DYE: CPT

## 2018-11-16 PROCEDURE — 80076 HEPATIC FUNCTION PANEL: CPT

## 2018-11-16 PROCEDURE — 36415 COLL VENOUS BLD VENIPUNCTURE: CPT

## 2018-11-16 PROCEDURE — 85730 THROMBOPLASTIN TIME PARTIAL: CPT

## 2018-11-16 PROCEDURE — 82962 GLUCOSE BLOOD TEST: CPT

## 2018-11-16 PROCEDURE — 99285 EMERGENCY DEPT VISIT HI MDM: CPT

## 2018-11-16 PROCEDURE — 80048 BASIC METABOLIC PNL TOTAL CA: CPT

## 2018-11-16 PROCEDURE — 85025 COMPLETE CBC W/AUTO DIFF WBC: CPT

## 2018-11-16 PROCEDURE — 96374 THER/PROPH/DIAG INJ IV PUSH: CPT

## 2018-11-16 PROCEDURE — 84484 ASSAY OF TROPONIN QUANT: CPT

## 2018-11-16 RX ADMIN — HYDRALAZINE HYDROCHLORIDE ONE MG: 20 INJECTION INTRAMUSCULAR; INTRAVENOUS at 02:32

## 2018-11-16 NOTE — NUR
BIB BROTHER, C/O SLURRED SPEECH SINCE 2PM AFTER VERBAL ARGUMENT WITH A . 
HX OF RECENT CVA SEPTEMBER 2018. PT IS TACHYCARDIC AND HYPERTENSIVE BUT 
OTHERWISE VSS NO ACUTE DISTRESS NOTED AT THIS TIME. PT IS ALERT AND ORIENTED X4 
ABLE TO MAKE NEEDS KNOWN. 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 
FOR DIALYSIS FISTULA. WILL CONTINUE TO MONITOR FOR ANY CHANGES DURING THE 
SHIFT.

## 2018-12-21 ENCOUNTER — HOSPITAL ENCOUNTER (EMERGENCY)
Dept: HOSPITAL 54 - ER | Age: 53
Discharge: HOME | End: 2018-12-21
Payer: MEDICARE

## 2018-12-21 VITALS — WEIGHT: 153 LBS | BODY MASS INDEX: 24.01 KG/M2 | HEIGHT: 67 IN

## 2018-12-21 VITALS — DIASTOLIC BLOOD PRESSURE: 81 MMHG | SYSTOLIC BLOOD PRESSURE: 170 MMHG

## 2018-12-21 DIAGNOSIS — Z98.890: ICD-10-CM

## 2018-12-21 DIAGNOSIS — Z79.899: ICD-10-CM

## 2018-12-21 DIAGNOSIS — Z99.2: ICD-10-CM

## 2018-12-21 DIAGNOSIS — Z86.73: ICD-10-CM

## 2018-12-21 DIAGNOSIS — R51: ICD-10-CM

## 2018-12-21 DIAGNOSIS — Z88.5: ICD-10-CM

## 2018-12-21 DIAGNOSIS — N18.6: ICD-10-CM

## 2018-12-21 DIAGNOSIS — I12.0: Primary | ICD-10-CM

## 2018-12-21 LAB
BASOPHILS # BLD AUTO: 0.1 /CMM (ref 0–0.2)
BASOPHILS NFR BLD AUTO: 1.3 % (ref 0–2)
BUN SERPL-MCNC: 37 MG/DL (ref 7–18)
CALCIUM SERPL-MCNC: 9.3 MG/DL (ref 8.5–10.1)
CHLORIDE SERPL-SCNC: 100 MMOL/L (ref 98–107)
CO2 SERPL-SCNC: 28 MMOL/L (ref 21–32)
CREAT SERPL-MCNC: 5.3 MG/DL (ref 0.6–1.3)
EOSINOPHIL NFR BLD AUTO: 2.8 % (ref 0–6)
GLUCOSE SERPL-MCNC: 109 MG/DL (ref 74–106)
HCT VFR BLD AUTO: 35 % (ref 33–45)
HGB BLD-MCNC: 11.3 G/DL (ref 11.5–14.8)
LYMPHOCYTES NFR BLD AUTO: 0.6 /CMM (ref 0.8–4.8)
LYMPHOCYTES NFR BLD AUTO: 15.1 % (ref 20–44)
MCHC RBC AUTO-ENTMCNC: 33 G/DL (ref 31–36)
MCV RBC AUTO: 93 FL (ref 82–100)
MONOCYTES NFR BLD AUTO: 0.6 /CMM (ref 0.1–1.3)
MONOCYTES NFR BLD AUTO: 13.7 % (ref 2–12)
NEUTROPHILS # BLD AUTO: 2.7 /CMM (ref 1.8–8.9)
NEUTROPHILS NFR BLD AUTO: 67.1 % (ref 43–81)
PLATELET # BLD AUTO: 146 /CMM (ref 150–450)
POTASSIUM SERPL-SCNC: 3.6 MMOL/L (ref 3.5–5.1)
RBC # BLD AUTO: 3.71 MIL/UL (ref 4–5.2)
SODIUM SERPL-SCNC: 139 MMOL/L (ref 136–145)
WBC NRBC COR # BLD AUTO: 4.1 K/UL (ref 4.3–11)

## 2018-12-21 PROCEDURE — Z7610: HCPCS

## 2018-12-21 PROCEDURE — A4606 OXYGEN PROBE USED W OXIMETER: HCPCS

## 2018-12-21 NOTE — NUR
Pt BIB [facility], C/O HEADACHE AND QM=104/120 AFTER DIALYSIS, PT 
IS AAOX4, NOT IN RESPIRATORY DISTRESS, KEPT RESTED AND COMFORTABLE, WILL 
CONTINUE TO MONITOR.

## 2019-01-19 ENCOUNTER — HOSPITAL ENCOUNTER (EMERGENCY)
Dept: HOSPITAL 54 - ER | Age: 54
Discharge: HOME | End: 2019-01-19
Payer: MEDICARE

## 2019-01-19 VITALS — SYSTOLIC BLOOD PRESSURE: 159 MMHG | DIASTOLIC BLOOD PRESSURE: 91 MMHG

## 2019-01-19 VITALS — HEIGHT: 67 IN | BODY MASS INDEX: 21.66 KG/M2 | WEIGHT: 138 LBS

## 2019-01-19 DIAGNOSIS — N18.6: ICD-10-CM

## 2019-01-19 DIAGNOSIS — Z98.890: ICD-10-CM

## 2019-01-19 DIAGNOSIS — I12.0: ICD-10-CM

## 2019-01-19 DIAGNOSIS — H11.33: Primary | ICD-10-CM

## 2019-01-19 DIAGNOSIS — Z86.73: ICD-10-CM

## 2019-01-19 DIAGNOSIS — Z79.899: ICD-10-CM

## 2019-01-19 DIAGNOSIS — Z99.2: ICD-10-CM

## 2019-01-19 PROCEDURE — Z7502: HCPCS

## 2019-04-21 ENCOUNTER — HOSPITAL ENCOUNTER (EMERGENCY)
Dept: HOSPITAL 54 - ER | Age: 54
Discharge: HOME | End: 2019-04-21
Payer: MEDICARE

## 2019-04-21 VITALS — SYSTOLIC BLOOD PRESSURE: 135 MMHG | DIASTOLIC BLOOD PRESSURE: 71 MMHG

## 2019-04-21 VITALS — HEIGHT: 66 IN | BODY MASS INDEX: 23.3 KG/M2 | WEIGHT: 145 LBS
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
## 2019-04-21 DIAGNOSIS — I12.0: ICD-10-CM

## 2019-04-21 DIAGNOSIS — Z99.2: ICD-10-CM

## 2019-04-21 DIAGNOSIS — M25.471: ICD-10-CM

## 2019-04-21 DIAGNOSIS — N18.6: ICD-10-CM
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
## 2019-04-21 DIAGNOSIS — Z95.5: ICD-10-CM

## 2019-04-21 DIAGNOSIS — Z86.73: ICD-10-CM

## 2019-04-21 DIAGNOSIS — S92.251A: Primary | ICD-10-CM

## 2019-04-21 DIAGNOSIS — Z88.5: ICD-10-CM

## 2019-04-21 DIAGNOSIS — Z98.890: ICD-10-CM
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
## 2019-05-13 ENCOUNTER — HOSPITAL ENCOUNTER (EMERGENCY)
Dept: HOSPITAL 54 - ER | Age: 54
Discharge: HOME | End: 2019-05-13
Payer: MEDICARE

## 2019-05-13 VITALS — HEIGHT: 69 IN | WEIGHT: 174 LBS | BODY MASS INDEX: 25.77 KG/M2

## 2019-05-13 VITALS — DIASTOLIC BLOOD PRESSURE: 77 MMHG | SYSTOLIC BLOOD PRESSURE: 134 MMHG

## 2019-05-13 DIAGNOSIS — Z98.890: ICD-10-CM

## 2019-05-13 DIAGNOSIS — Z86.73: ICD-10-CM

## 2019-05-13 DIAGNOSIS — B95.7: Primary | ICD-10-CM

## 2019-05-13 DIAGNOSIS — Z88.5: ICD-10-CM

## 2019-05-13 DIAGNOSIS — N18.6: ICD-10-CM

## 2019-05-13 DIAGNOSIS — Z99.2: ICD-10-CM

## 2019-05-13 DIAGNOSIS — I12.0: ICD-10-CM

## 2019-05-13 RX ADMIN — SULFAMETHOXAZOLE AND TRIMETHOPRIM ONE UDTAB: 800; 160 TABLET ORAL at 14:00

## 2020-10-15 NOTE — NUR
RN NOTES

PATIENT IN BED AWAKE. RESPIRATIONS EVEN. DENIES ANY PAIN AT THIS TIME. CLONIDINE PRN GIVEN 
FOR INCREASED BP. NEEDS ATTENDED. SAFETY PRECAUTIONS AND COMFORT MEASURES IN PLACE. WILL 
GIVE REPORT TO DAY SHIFT FOR CONTINUITY OF CARE. [As Noted in HPI] : as noted in HPI [Fever] : no fever [Red Eyes] : eyes not red [Eye Pain] : no eye pain [Chills] : no chills [Nosebleeds] : no nosebleeds [Earache] : no earache [Abdominal Pain] : no abdominal pain [Chest Pain] : no chest pain [Shortness Of Breath] : no shortness of breath [Vomiting] : no vomiting [Dysuria] : no dysuria [Proptosis] : no proptosis [Confused] : no confusion [Suicidal] : not suicidal [Easy Bleeding] : no tendency for easy bleeding [Muscle Weakness] : no muscle weakness

## 2022-10-26 NOTE — NUR
RN NOTES



PT REFUSED LIPITOR, STATES "I DON'T WANT ANYMORE PILLS TONIGHT" Clofazimine Counseling:  I discussed with the patient the risks of clofazimine including but not limited to skin and eye pigmentation, liver damage, nausea/vomiting, gastrointestinal bleeding and allergy.